# Patient Record
Sex: MALE | Race: WHITE | NOT HISPANIC OR LATINO | Employment: FULL TIME | URBAN - METROPOLITAN AREA
[De-identification: names, ages, dates, MRNs, and addresses within clinical notes are randomized per-mention and may not be internally consistent; named-entity substitution may affect disease eponyms.]

---

## 2017-01-27 ENCOUNTER — OFFICE VISIT (OUTPATIENT)
Dept: URGENT CARE | Facility: CLINIC | Age: 57
End: 2017-01-27
Payer: COMMERCIAL

## 2017-01-27 VITALS
HEIGHT: 73 IN | HEART RATE: 93 BPM | WEIGHT: 290 LBS | RESPIRATION RATE: 17 BRPM | SYSTOLIC BLOOD PRESSURE: 122 MMHG | TEMPERATURE: 98.1 F | OXYGEN SATURATION: 95 % | DIASTOLIC BLOOD PRESSURE: 78 MMHG | BODY MASS INDEX: 38.43 KG/M2

## 2017-01-27 DIAGNOSIS — J02.0 STREP THROAT: ICD-10-CM

## 2017-01-27 DIAGNOSIS — K52.9 GASTROENTERITIS: ICD-10-CM

## 2017-01-27 PROCEDURE — 99203 OFFICE O/P NEW LOW 30 MIN: CPT | Performed by: NURSE PRACTITIONER

## 2017-01-27 RX ORDER — FENOFIBRATE 200 MG/1
CAPSULE ORAL
COMMUNITY
Start: 2017-01-16 | End: 2017-03-03 | Stop reason: SDUPTHER

## 2017-01-27 RX ORDER — ROSUVASTATIN CALCIUM 10 MG/1
TABLET, COATED ORAL
COMMUNITY
Start: 2017-01-16 | End: 2017-03-03 | Stop reason: SDUPTHER

## 2017-01-27 RX ORDER — AMOXICILLIN 500 MG/1
500 CAPSULE ORAL 3 TIMES DAILY
Qty: 30 CAP | Refills: 0 | Status: SHIPPED | OUTPATIENT
Start: 2017-01-27 | End: 2017-02-06

## 2017-01-27 RX ORDER — ONDANSETRON 4 MG/1
4 TABLET, ORALLY DISINTEGRATING ORAL EVERY 8 HOURS PRN
Qty: 10 TAB | Refills: 0 | Status: SHIPPED | OUTPATIENT
Start: 2017-01-27 | End: 2017-03-03

## 2017-01-27 ASSESSMENT — ENCOUNTER SYMPTOMS
HEADACHES: 1
TROUBLE SWALLOWING: 1
DIARRHEA: 1
SORE THROAT: 1
SWOLLEN GLANDS: 1
CARDIOVASCULAR NEGATIVE: 1
NAUSEA: 1
VOMITING: 1
MYALGIAS: 1
ABDOMINAL PAIN: 0
BLOOD IN STOOL: 0
CHILLS: 1
RESPIRATORY NEGATIVE: 1

## 2017-01-27 NOTE — PROGRESS NOTES
"Subjective:      Antonio Foley is a 56 y.o. male who presents with Pharyngitis    PMH: denies hx of chronic illness    Social hx: non-smoker    Family hx: no other ill family members    Allergies: Review of patient's allergies indicates not on file.    This patient is a 56-year-old male who presents today with 2 complaints. First, he has been having nausea vomiting and diarrhea for the last 3 days. This morning his no longer having vomiting but is still having diarrhea. He reports stool is watery with no blood or mucus. He has traveled domestic lead to [facility] but no foreign travel. No suspected food intake, and no recent use of antibiotics. Secondly, patient complains of sore throat and swollen glands to the left side. He has had body aches and chills but no known fever.          Pharyngitis   This is a new problem. The current episode started yesterday. The problem has been unchanged. The pain is worse on the left side. There has been no fever. Associated symptoms include diarrhea, headaches, swollen glands, trouble swallowing and vomiting. Pertinent negatives include no abdominal pain. Associated symptoms comments: Vomiting and diarrhea. He has tried nothing for the symptoms. The treatment provided no relief.       Review of Systems   Constitutional: Positive for chills and malaise/fatigue.   HENT: Positive for sore throat and trouble swallowing.    Respiratory: Negative.    Cardiovascular: Negative.    Gastrointestinal: Positive for nausea, vomiting and diarrhea. Negative for abdominal pain and blood in stool.   Genitourinary: Negative.    Musculoskeletal: Positive for myalgias.   Neurological: Positive for headaches.       All other systems reviewed and are negative      Objective:     /78 mmHg  Pulse 93  Temp(Src) 36.7 °C (98.1 °F)  Resp 17  Ht 1.854 m (6' 1\")  Wt 131.543 kg (290 lb)  BMI 38.27 kg/m2  SpO2 95%     Physical Exam   Constitutional: He is oriented to person, place, and " time. He appears well-developed and well-nourished.   HENT:   Right Ear: External ear normal.   Left Ear: External ear normal.   Nose: Nose normal.   Mouth/Throat: No oropharyngeal exudate.   Oropharynx slightly red    Eyes: Conjunctivae and EOM are normal. Pupils are equal, round, and reactive to light. Right eye exhibits no discharge. Left eye exhibits no discharge.   Neck: Normal range of motion. Neck supple.   Cardiovascular: Normal rate and regular rhythm.    Pulmonary/Chest: Effort normal and breath sounds normal.   Abdominal: Soft. Bowel sounds are normal. He exhibits no distension. There is no tenderness.   Lymphadenopathy:     He has cervical adenopathy.   Neurological: He is alert and oriented to person, place, and time.   Skin: Skin is warm and dry.   Vitals reviewed.    Point-of-care strep: Positive          Assessment/Plan:   Gastroenteritis  Strep throat   -zofran   -imodium   -amoxil   -push fluids   -ER precautions for intractable vomiting/diarrhea or devleoping abdominal pain   -bland diet until symptoms resolve   There are no diagnoses linked to this encounter.

## 2017-01-27 NOTE — MR AVS SNAPSHOT
"        Antonio Snell Og   2017 8:45 AM   Office Visit   MRN: [id number]    Department:  [facility]   Dept Phone:  [phone]    Description:  Male : 1960   Provider:  Cathey J Hamman, A.P.N.           Reason for Visit     Pharyngitis congestion, cough, vomiting and diarrhea, x3days      Allergies as of 2017     Not on File      You were diagnosed with     Strep throat   [190687]       Gastroenteritis   [657769]         Vital Signs     Blood Pressure Pulse Temperature Respirations Height Weight    122/78 mmHg 93 36.7 °C (98.1 °F) 17 1.854 m (6' 1\") 131.543 kg (290 lb)    Body Mass Index Oxygen Saturation                38.27 kg/m2 95%          Basic Information     Date Of Birth Sex Race Ethnicity Preferred Language    1960 Male White Non- English      Health Maintenance     Patient has no pending health maintenance at this time      Current Immunizations     No immunizations on file.      Below and/or attached are the medications your provider expects you to take. Review all of your home medications and newly ordered medications with your provider and/or pharmacist. Follow medication instructions as directed by your provider and/or pharmacist. Please keep your medication list with you and share with your provider. Update the information when medications are discontinued, doses are changed, or new medications (including over-the-counter products) are added; and carry medication information at all times in the event of emergency situations     Allergies:  No Known Allergies          Medications  Valid as of: 2017 -  9:35 AM    Generic Name Brand Name Tablet Size Instructions for use    Amoxicillin (Cap) AMOXIL 500 MG Take 1 Cap by mouth 3 times a day for 10 days.        Fenofibrate Micronized (Cap) LOFIBRA 200 MG         Ondansetron (TABLET DISPERSIBLE) ZOFRAN ODT 4 MG Take 1 Tab by mouth every 8 hours as needed.        Rosuvastatin Calcium (Tab) CRESTOR 10 MG   "        Sertraline HCl   Take  by mouth.        .                 Medicines prescribed today were sent to:     [id number] - [place], [place] - [address]    [address] [place] [address]    Phone: [phone] Fax: [phone]    Open 24 Hours?: No      Medication refill instructions:       If your prescription bottle indicates you have medication refills left, it is not necessary to call your provider’s office. Please contact your pharmacy and they will refill your medication.    If your prescription bottle indicates you do not have any refills left, you may request refills at any time through one of the following ways: The online Spark system (except Urgent Care), by calling your provider’s office, or by asking your pharmacy to contact your provider’s office with a refill request. Medication refills are processed only during regular business hours and may not be available until the next business day. Your provider may request additional information or to have a follow-up visit with you prior to refilling your medication.   *Please Note: Medication refills are assigned a new Rx number when refilled electronically. Your pharmacy may indicate that no refills were authorized even though a new prescription for the same medication is available at the pharmacy. Please request the medicine by name with the pharmacy before contacting your provider for a refill.           Spark Access Code: [email or web address]  Expires: 2/26/2017  9:35 AM    Your email address is not on file at [x+1].  Email Addresses are required for you to sign up for Spark, please contact [phone] to verify your personal information and to provide your email address prior to attempting to register for Spark.    Antonio Foley  [address]  [place] [address]    Spark  A secure, online tool to manage your health information     [x+1]’s Spark® is a secure, online tool that connects you to  your personalized health information from the privacy of your home -- day or night - making it very easy for you to manage your healthcare. Once the activation process is completed, you can even access your medical information using the AdvanDx ciara, which is available for free in the Apple Ciara store or Google Play store.     To learn more about AdvanDx, visit [email or web address]    There are two levels of access available (as shown below):   My Chart Features  [facility] Doctor [facility]  Specialists [facility]  Urgent  Care Non-[facility] Primary Care Doctor   Email your healthcare team securely and privately 24/7 X X X    Manage appointments: schedule your next appointment; view details of past/upcoming appointments X      Request prescription refills. X      View recent personal medical records, including lab and immunizations X X X X   View health record, including health history, allergies, medications X X X X   Read reports about your outpatient visits, procedures, consult and ER notes X X X X   See your discharge summary, which is a recap of your hospital and/or ER visit that includes your diagnosis, lab results, and care plan X X  X     How to register for AdvanDx:  Once your e-mail address has been verified, follow the following steps to sign up for AdvanDx.     1. Go to  [email or web address]  2. Click on the Sign Up Now box, which takes you to the New Member Sign Up page. You will need to provide the following information:  a. Enter your AdvanDx Access Code exactly as it appears at the top of this page. (You will not need to use this code after you’ve completed the sign-up process. If you do not sign up before the expiration date, you must request a new code.)   b. Enter your date of birth.   c. Enter your home email address.   d. Click Submit, and follow the next screen’s instructions.  3. Create a AdvanDx ID. This will be your AdvanDx login ID and cannot be changed, so think of one that is secure  and easy to remember.  4. Create a Zuli password. You can change your password at any time.  5. Enter your Password Reset Question and Answer. This can be used at a later time if you forget your password.   6. Enter your e-mail address. This allows you to receive e-mail notifications when new information is available in Zuli.  7. Click Sign Up. You can now view your health information.    For assistance activating your Zuli account, call [phone]

## 2017-03-03 ENCOUNTER — OFFICE VISIT (OUTPATIENT)
Dept: MEDICAL GROUP | Facility: MEDICAL CENTER | Age: 57
End: 2017-03-03
Payer: COMMERCIAL

## 2017-03-03 VITALS
BODY MASS INDEX: 39.28 KG/M2 | HEIGHT: 73 IN | SYSTOLIC BLOOD PRESSURE: 116 MMHG | OXYGEN SATURATION: 94 % | TEMPERATURE: 97.9 F | WEIGHT: 296.4 LBS | HEART RATE: 64 BPM | RESPIRATION RATE: 18 BRPM | DIASTOLIC BLOOD PRESSURE: 74 MMHG

## 2017-03-03 DIAGNOSIS — E78.2 MIXED HYPERLIPIDEMIA: ICD-10-CM

## 2017-03-03 DIAGNOSIS — E66.9 OBESITY (BMI 30-39.9): ICD-10-CM

## 2017-03-03 DIAGNOSIS — Z76.89 ENCOUNTER TO ESTABLISH CARE: ICD-10-CM

## 2017-03-03 DIAGNOSIS — F32.A ANXIETY AND DEPRESSION: ICD-10-CM

## 2017-03-03 DIAGNOSIS — F41.9 ANXIETY AND DEPRESSION: ICD-10-CM

## 2017-03-03 DIAGNOSIS — Z00.00 PREVENTATIVE HEALTH CARE: ICD-10-CM

## 2017-03-03 PROCEDURE — 99204 OFFICE O/P NEW MOD 45 MIN: CPT | Performed by: PHYSICIAN ASSISTANT

## 2017-03-03 RX ORDER — SERTRALINE HYDROCHLORIDE 100 MG/1
150 TABLET, FILM COATED ORAL DAILY
COMMUNITY
End: 2017-03-03 | Stop reason: SDUPTHER

## 2017-03-03 RX ORDER — ROSUVASTATIN CALCIUM 10 MG/1
10 TABLET, COATED ORAL
Qty: 90 TAB | Refills: 3 | Status: SHIPPED | OUTPATIENT
Start: 2017-03-03 | End: 2018-01-09 | Stop reason: SDUPTHER

## 2017-03-03 RX ORDER — SERTRALINE HYDROCHLORIDE 100 MG/1
150 TABLET, FILM COATED ORAL DAILY
Qty: 135 TAB | Refills: 3 | Status: SHIPPED | OUTPATIENT
Start: 2017-03-03 | End: 2018-01-11 | Stop reason: SDUPTHER

## 2017-03-03 RX ORDER — FENOFIBRATE 200 MG/1
200 CAPSULE ORAL
Qty: 90 CAP | Refills: 3 | Status: SHIPPED | OUTPATIENT
Start: 2017-03-03 | End: 2018-01-09 | Stop reason: SDUPTHER

## 2017-03-03 ASSESSMENT — PATIENT HEALTH QUESTIONNAIRE - PHQ9: CLINICAL INTERPRETATION OF PHQ2 SCORE: 0

## 2017-03-03 ASSESSMENT — PAIN SCALES - GENERAL: PAINLEVEL: NO PAIN

## 2017-03-03 NOTE — PROGRESS NOTES
"Subjective:   Antonio Foley is a 56 y.o. male here today to establish care and for medication refill for anxiety and hyperlipidemia.    Anxiety and depression  This is a 56-year-old male who is here today for some medication refills. Has a history of anxiety with some depression. For 10 years has been taking sertraline 150 mg daily. Denies any homicidal or suicidal ideations. Symptoms are controlled.    Mixed hyperlipidemia  Also has a history of hyperlipidemia with elevated cholesterol and triglycerides. He is currently taking fenofibrate 200 mg daily and Crestor 10 mg daily. States that last labs performed showed cholesterol within normal limits.    Preventative health care  He had a colonoscopy a year ago. No polyps noted.    Obesity (BMI 30-39.9)  He realizes that his weight is high. He is currently trying to get into a gym at his facility to start exercising.       Current medicines (including changes today)  Current Outpatient Prescriptions   Medication Sig Dispense Refill   • sertraline (ZOLOFT) 100 MG Tab Take 1.5 Tabs by mouth every day. 135 Tab 3   • rosuvastatin (CRESTOR) 10 MG Tab Take 1 Tab by mouth every bedtime. 90 Tab 3   • fenofibrate micronized (LOFIBRA) 200 MG capsule Take 1 Cap by mouth every morning before breakfast. 90 Cap 3     No current facility-administered medications for this visit.     He  has no past medical history on file.    ROS   No chest pain, no shortness of breath, no abdominal pain and all other systems were reviewed and are negative.       Objective:     Blood pressure 116/74, pulse 64, temperature 36.6 °C (97.9 °F), resp. rate 18, height 1.854 m (6' 0.99\"), weight 134.446 kg (296 lb 6.4 oz), SpO2 94 %. Body mass index is 39.11 kg/(m^2).   Physical Exam:  Constitutional: Alert, no distress.  Skin: Warm, dry, good turgor, no rashes in visible areas.  Eye: Equal, round and reactive, conjunctiva clear, lids normal.  ENMT: Lips without lesions, good dentition, oropharynx " clear.  Neck: Trachea midline, no masses.   Lymph: No cervical or supraclavicular lymphadenopathy  Respiratory: Unlabored respiratory effort, lungs clear to auscultation, no wheezes, no ronchi.  Cardiovascular: Normal S1, S2, no murmur, no edema.  Abdomen: Soft, non-tender, no masses.  Psych: Alert and oriented x3, normal affect and mood.        Assessment and Plan:   The following treatment plan was discussed    1. Anxiety and depression  Chronic condition and controlled. Prescribed sertraline 150 mg daily. Offered referral to psychiatry but symptoms are well controlled.    2. Mixed hyperlipidemia  Chronic condition. Prescribed fenofibrate and Crestor 10 mg. Ordered lipid profile fasting. He will be contacted with results.  - LIPID PROFILE; Future    3. Obesity (BMI 30-39.9)  Discussed with importance of diet and exercising. We'll monitor.  - Patient identified as having weight management issue.  Appropriate orders and counseling given.    4. Preventative health care  Obtain fasting labs. He will be contacted with results. We'll try to obtain previous medical records for colonoscopy results.  - COMP METABOLIC PANEL; Future  - HEMOGLOBIN A1C; Future  - PROSTATE SPECIFIC AG    5. Encounter to establish care      Followup: Return if symptoms worsen or fail to improve.    Please note that this dictation was created using voice recognition software. I have made every reasonable attempt to correct obvious errors, but I expect that there are errors of grammar and possibly content that I did not discover before finalizing the note.

## 2017-03-03 NOTE — ASSESSMENT & PLAN NOTE
He realizes that his weight is high. He is currently trying to get into a gym at his facility to start exercising.

## 2017-03-03 NOTE — ASSESSMENT & PLAN NOTE
This is a 56-year-old male who is here today for some medication refills. Has a history of anxiety with some depression. For 10 years has been taking sertraline 150 mg daily. Denies any homicidal or suicidal ideations. Symptoms are controlled.

## 2017-03-03 NOTE — ASSESSMENT & PLAN NOTE
Also has a history of hyperlipidemia with elevated cholesterol and triglycerides. He is currently taking fenofibrate 200 mg daily and Crestor 10 mg daily. States that last labs performed showed cholesterol within normal limits.

## 2017-03-03 NOTE — MR AVS SNAPSHOT
"        Antonio Foley   3/3/2017 7:40 AM   Office Visit   MRN: [id number]    Department:  [place] [address]   Dept Phone:  [phone]    Description:  Male : 1960   Provider:  Jaime Castañeda PA-C           Reason for Visit     Establish Care physical with labs    Medication Refill Zoloft, fenofibrate and crestpr      Allergies as of 3/3/2017     No Known Allergies      You were diagnosed with     Anxiety and depression   [883107]       Mixed hyperlipidemia   [272.2.ICD-9-CM]       Obesity (BMI 30-39.9)   [843497]       Preventative health care   [337770]       Encounter to establish care   [324460]         Vital Signs     Blood Pressure Pulse Temperature Respirations Height Weight    116/74 mmHg 64 36.6 °C (97.9 °F) 18 1.854 m (6' 0.99\") 134.446 kg (296 lb 6.4 oz)    Body Mass Index Oxygen Saturation Smoking Status             39.11 kg/m2 94% Never Smoker          Basic Information     Date Of Birth Sex Race Ethnicity Preferred Language    1960 Male White Non- English      Problem List              ICD-10-CM Priority Class Noted - Resolved    Anxiety and depression F41.9, F32.9   3/3/2017 - Present    Mixed hyperlipidemia E78.2   3/3/2017 - Present    Preventative health care Z00.00   3/3/2017 - Present    Obesity (BMI 30-39.9) E66.9   3/3/2017 - Present      Health Maintenance        Date Due Completion Dates    IMM DTaP/Tdap/Td Vaccine (1 - Tdap) 1980 ---    IMM INFLUENZA (1) 3/3/2018 (Originally 2016) ---    COLONOSCOPY 3/1/2025 (Originally 2011) ---            Current Immunizations     No immunizations on file.      Below and/or attached are the medications your provider expects you to take. Review all of your home medications and newly ordered medications with your provider and/or pharmacist. Follow medication instructions as directed by your provider and/or pharmacist. Please keep your medication list with you and share with your provider. Update the information " when medications are discontinued, doses are changed, or new medications (including over-the-counter products) are added; and carry medication information at all times in the event of emergency situations     Allergies:  No Known Allergies          Medications  Valid as of: March 03, 2017 -  8:19 AM    Generic Name Brand Name Tablet Size Instructions for use    Fenofibrate Micronized (Cap) LOFIBRA 200 MG Take 1 Cap by mouth every morning before breakfast.        Rosuvastatin Calcium (Tab) CRESTOR 10 MG Take 1 Tab by mouth every bedtime.        Sertraline HCl (Tab) ZOLOFT 100 MG Take 1.5 Tabs by mouth every day.        .                 Medicines prescribed today were sent to:     [id number] - [place], [place] - [address]    [address] [place] [address]    Phone: [phone] Fax: [phone]    Open 24 Hours?: No      Medication refill instructions:       If your prescription bottle indicates you have medication refills left, it is not necessary to call your provider’s office. Please contact your pharmacy and they will refill your medication.    If your prescription bottle indicates you do not have any refills left, you may request refills at any time through one of the following ways: The online [employer] system (except Urgent Care), by calling your provider’s office, or by asking your pharmacy to contact your provider’s office with a refill request. Medication refills are processed only during regular business hours and may not be available until the next business day. Your provider may request additional information or to have a follow-up visit with you prior to refilling your medication.   *Please Note: Medication refills are assigned a new Rx number when refilled electronically. Your pharmacy may indicate that no refills were authorized even though a new prescription for the same medication is available at the pharmacy. Please request the medicine by name with the pharmacy before contacting  your provider for a refill.        Your To Do List     Future Labs/Procedures Complete By Expires    COMP METABOLIC PANEL  As directed 3/3/2018    HEMOGLOBIN A1C  As directed 3/3/2018    LIPID PROFILE  As directed 3/3/2018         MyChart Access Code: Activation code not generated  Current MyChart Status: Active

## 2018-03-28 RX ORDER — ROSUVASTATIN CALCIUM 10 MG/1
TABLET, COATED ORAL
Qty: 90 TAB | Refills: 0 | Status: SHIPPED | OUTPATIENT
Start: 2018-03-28 | End: 2018-03-31 | Stop reason: SDUPTHER

## 2018-03-28 RX ORDER — FENOFIBRATE 200 MG/1
CAPSULE ORAL
Qty: 90 CAP | Refills: 0 | Status: SHIPPED | OUTPATIENT
Start: 2018-03-28 | End: 2018-03-31 | Stop reason: SDUPTHER

## 2018-03-31 ENCOUNTER — OFFICE VISIT (OUTPATIENT)
Dept: URGENT CARE | Facility: CLINIC | Age: 58
End: 2018-03-31
Payer: COMMERCIAL

## 2018-03-31 VITALS
SYSTOLIC BLOOD PRESSURE: 118 MMHG | HEIGHT: 72 IN | OXYGEN SATURATION: 95 % | HEART RATE: 62 BPM | WEIGHT: 293 LBS | TEMPERATURE: 97.7 F | BODY MASS INDEX: 39.68 KG/M2 | RESPIRATION RATE: 16 BRPM | DIASTOLIC BLOOD PRESSURE: 78 MMHG

## 2018-03-31 DIAGNOSIS — R05.8 PRODUCTIVE COUGH: ICD-10-CM

## 2018-03-31 DIAGNOSIS — F41.9 ANXIETY: ICD-10-CM

## 2018-03-31 DIAGNOSIS — E78.2 MIXED HYPERLIPIDEMIA: ICD-10-CM

## 2018-03-31 PROCEDURE — 99214 OFFICE O/P EST MOD 30 MIN: CPT | Performed by: NURSE PRACTITIONER

## 2018-03-31 RX ORDER — ALBUTEROL SULFATE 90 UG/1
2 AEROSOL, METERED RESPIRATORY (INHALATION) EVERY 6 HOURS PRN
Qty: 8.5 G | Refills: 0 | Status: SHIPPED | OUTPATIENT
Start: 2018-03-31 | End: 2018-10-18

## 2018-03-31 RX ORDER — CODEINE PHOSPHATE AND GUAIFENESIN 10; 100 MG/5ML; MG/5ML
5 SOLUTION ORAL EVERY 6 HOURS PRN
Qty: 120 ML | Refills: 0 | Status: SHIPPED | OUTPATIENT
Start: 2018-03-31 | End: 2018-04-07

## 2018-03-31 RX ORDER — FENOFIBRATE 200 MG/1
CAPSULE ORAL
Qty: 30 CAP | Refills: 0 | Status: SHIPPED | OUTPATIENT
Start: 2018-03-31 | End: 2018-09-28 | Stop reason: SDUPTHER

## 2018-03-31 RX ORDER — SERTRALINE HYDROCHLORIDE 100 MG/1
TABLET, FILM COATED ORAL
Qty: 45 TAB | Refills: 0 | Status: SHIPPED | OUTPATIENT
Start: 2018-03-31 | End: 2018-09-28 | Stop reason: SDUPTHER

## 2018-03-31 RX ORDER — ROSUVASTATIN CALCIUM 10 MG/1
TABLET, COATED ORAL
Qty: 30 TAB | Refills: 0 | Status: SHIPPED | OUTPATIENT
Start: 2018-03-31 | End: 2018-09-28 | Stop reason: SDUPTHER

## 2018-03-31 RX ORDER — DOXYCYCLINE HYCLATE 100 MG
100 TABLET ORAL 2 TIMES DAILY
Qty: 14 TAB | Refills: 0 | Status: SHIPPED | OUTPATIENT
Start: 2018-03-31 | End: 2018-04-07

## 2018-03-31 ASSESSMENT — ENCOUNTER SYMPTOMS
SPUTUM PRODUCTION: 1
MYALGIAS: 1
COUGH: 1
CHILLS: 1
SHORTNESS OF BREATH: 1

## 2018-03-31 NOTE — PROGRESS NOTES
Subjective:      Antonio Foley is a 57 y.o. male who presents with Cough (green phlegm, x2wks, in last week has progressed, tired)    History reviewed. No pertinent past medical history.  Social History     Social History   • Marital status:      Spouse name: N/A   • Number of children: N/A   • Years of education: N/A     Occupational History   • Not on file.     Social History Main Topics   • Smoking status: Never Smoker   • Smokeless tobacco: Never Used   • Alcohol use 1.2 oz/week     2 Shots of liquor per week   • Drug use: No   • Sexual activity: Yes     Partners: Male     Other Topics Concern   • Not on file     Social History Narrative   • No narrative on file     History reviewed. No pertinent family history.    Allergies: Patient has no known allergies.    Patient is a 57-year-old male who presents today with complaint of productive cough over the last 2 weeks. He states symptoms have been persistent. He denies fever, aches, or chills. He does state that the cough is keeping him from sleeping well at night.    Secondly, patient has an appointment to see his primary care physician however is about to run out of his routine medications which include Zoloft, Crestor, and Lofibra. He is requesting a one-month refill on these medications until he can get into his PCP.          Cough   This is a new problem. The current episode started in the past 7 days. The problem has been unchanged. The cough is productive of purulent sputum. Associated symptoms include chills, myalgias, nasal congestion, postnasal drip and shortness of breath. Nothing aggravates the symptoms. He has tried nothing for the symptoms. The treatment provided no relief.       Review of Systems   Constitutional: Positive for chills and malaise/fatigue.   HENT: Positive for congestion and postnasal drip.    Respiratory: Positive for cough, sputum production and shortness of breath.    Musculoskeletal: Positive for myalgias.   Skin:  Negative.    All other systems reviewed and are negative.         Objective:     /78   Pulse 62   Temp 36.5 °C (97.7 °F)   Resp 16   Ht 1.829 m (6')   Wt (!) 132.9 kg (293 lb)   SpO2 95%   BMI 39.74 kg/m²      Physical Exam   Constitutional: He is oriented to person, place, and time. He appears well-developed and well-nourished.   HENT:   Head: Normocephalic.   Right Ear: External ear normal.   Left Ear: External ear normal.   Nose: Nose normal.   Mouth/Throat: Oropharynx is clear and moist. No oropharyngeal exudate.   Eyes: Conjunctivae and EOM are normal. Pupils are equal, round, and reactive to light.   Neck: Normal range of motion. Neck supple.   Cardiovascular: Normal rate and regular rhythm.    Pulmonary/Chest: Effort normal. He has wheezes.   Few scattered wheezes and coarse rhonchi.   Musculoskeletal: Normal range of motion.   Neurological: He is alert and oriented to person, place, and time.   Skin: Skin is warm and dry. Capillary refill takes less than 2 seconds.   Psychiatric: He has a normal mood and affect. His behavior is normal. Judgment and thought content normal.   Vitals reviewed.              Assessment/Plan:     1. Productive cough    -Cheratussin; checked patient's  and find no evidence of narcotic misuse. ORT score is zero    2. Bronchitis  -Doxycycline  -Cheratussin; checked patient's  and find no evidence of narcotic misuse. ORT score is zero  -Albuterol when necessary   -Follow-up if symptoms persist or worsen      3. Anxiety and depression  -has been stable on zoloft  -no suicidal or homicidal ideation  -refilled zoloft x 1 month    4. Hyperlipidemia  -Refilled Lofibra and Crestor x 1 month  -keep follow up with PCP.

## 2018-05-02 RX ORDER — ROSUVASTATIN CALCIUM 10 MG/1
TABLET, COATED ORAL
Qty: 90 TAB | Refills: 1 | Status: SHIPPED | OUTPATIENT
Start: 2018-05-02 | End: 2018-09-28

## 2018-09-20 DIAGNOSIS — Z00.00 PREVENTATIVE HEALTH CARE: ICD-10-CM

## 2018-09-20 DIAGNOSIS — E78.5 HYPERLIPIDEMIA, UNSPECIFIED HYPERLIPIDEMIA TYPE: ICD-10-CM

## 2018-09-21 ENCOUNTER — HOSPITAL ENCOUNTER (OUTPATIENT)
Dept: LAB | Facility: MEDICAL CENTER | Age: 58
End: 2018-09-21
Attending: PHYSICIAN ASSISTANT
Payer: COMMERCIAL

## 2018-09-21 DIAGNOSIS — Z00.00 PREVENTATIVE HEALTH CARE: ICD-10-CM

## 2018-09-21 DIAGNOSIS — E78.5 HYPERLIPIDEMIA, UNSPECIFIED HYPERLIPIDEMIA TYPE: ICD-10-CM

## 2018-09-21 PROBLEM — E11.9 TYPE 2 DIABETES MELLITUS WITHOUT COMPLICATION, WITHOUT LONG-TERM CURRENT USE OF INSULIN (HCC): Status: ACTIVE | Noted: 2018-09-21

## 2018-09-21 LAB
ALBUMIN SERPL BCP-MCNC: 4.7 G/DL (ref 3.2–4.9)
ALBUMIN/GLOB SERPL: 1.8 G/DL
ALP SERPL-CCNC: 45 U/L (ref 30–99)
ALT SERPL-CCNC: 66 U/L (ref 2–50)
ANION GAP SERPL CALC-SCNC: 7 MMOL/L (ref 0–11.9)
AST SERPL-CCNC: 39 U/L (ref 12–45)
BILIRUB SERPL-MCNC: 0.7 MG/DL (ref 0.1–1.5)
BUN SERPL-MCNC: 15 MG/DL (ref 8–22)
CALCIUM SERPL-MCNC: 9.4 MG/DL (ref 8.5–10.5)
CHLORIDE SERPL-SCNC: 106 MMOL/L (ref 96–112)
CHOLEST SERPL-MCNC: 161 MG/DL (ref 100–199)
CO2 SERPL-SCNC: 27 MMOL/L (ref 20–33)
CREAT SERPL-MCNC: 0.92 MG/DL (ref 0.5–1.4)
EST. AVERAGE GLUCOSE BLD GHB EST-MCNC: 143 MG/DL
FASTING STATUS PATIENT QL REPORTED: NORMAL
GLOBULIN SER CALC-MCNC: 2.6 G/DL (ref 1.9–3.5)
GLUCOSE SERPL-MCNC: 144 MG/DL (ref 65–99)
HBA1C MFR BLD: 6.6 % (ref 0–5.6)
HDLC SERPL-MCNC: 38 MG/DL
LDLC SERPL CALC-MCNC: 47 MG/DL
POTASSIUM SERPL-SCNC: 4.3 MMOL/L (ref 3.6–5.5)
PROT SERPL-MCNC: 7.3 G/DL (ref 6–8.2)
SODIUM SERPL-SCNC: 140 MMOL/L (ref 135–145)
TRIGL SERPL-MCNC: 381 MG/DL (ref 0–149)

## 2018-09-21 PROCEDURE — 80053 COMPREHEN METABOLIC PANEL: CPT

## 2018-09-21 PROCEDURE — 83036 HEMOGLOBIN GLYCOSYLATED A1C: CPT

## 2018-09-21 PROCEDURE — 80061 LIPID PANEL: CPT

## 2018-09-21 PROCEDURE — 36415 COLL VENOUS BLD VENIPUNCTURE: CPT

## 2018-09-28 ENCOUNTER — HOSPITAL ENCOUNTER (OUTPATIENT)
Dept: LAB | Facility: MEDICAL CENTER | Age: 58
End: 2018-09-28
Attending: PHYSICIAN ASSISTANT
Payer: COMMERCIAL

## 2018-09-28 ENCOUNTER — OFFICE VISIT (OUTPATIENT)
Dept: MEDICAL GROUP | Facility: MEDICAL CENTER | Age: 58
End: 2018-09-28
Payer: COMMERCIAL

## 2018-09-28 VITALS
DIASTOLIC BLOOD PRESSURE: 60 MMHG | SYSTOLIC BLOOD PRESSURE: 125 MMHG | TEMPERATURE: 98.2 F | HEIGHT: 72 IN | BODY MASS INDEX: 39.82 KG/M2 | WEIGHT: 294 LBS | HEART RATE: 61 BPM | OXYGEN SATURATION: 94 %

## 2018-09-28 DIAGNOSIS — E78.2 MIXED HYPERLIPIDEMIA: ICD-10-CM

## 2018-09-28 DIAGNOSIS — R79.89 ABNORMAL CBC: ICD-10-CM

## 2018-09-28 DIAGNOSIS — E78.1 HYPERTRIGLYCERIDEMIA: ICD-10-CM

## 2018-09-28 DIAGNOSIS — R53.82 CHRONIC FATIGUE: ICD-10-CM

## 2018-09-28 DIAGNOSIS — F41.9 ANXIETY: ICD-10-CM

## 2018-09-28 DIAGNOSIS — G47.33 OBSTRUCTIVE SLEEP APNEA SYNDROME: ICD-10-CM

## 2018-09-28 DIAGNOSIS — E66.9 OBESITY (BMI 30-39.9): ICD-10-CM

## 2018-09-28 DIAGNOSIS — F32.A ANXIETY AND DEPRESSION: ICD-10-CM

## 2018-09-28 DIAGNOSIS — F41.9 ANXIETY AND DEPRESSION: ICD-10-CM

## 2018-09-28 DIAGNOSIS — E11.9 TYPE 2 DIABETES MELLITUS WITHOUT COMPLICATION, WITHOUT LONG-TERM CURRENT USE OF INSULIN (HCC): ICD-10-CM

## 2018-09-28 DIAGNOSIS — R22.2 ABDOMINAL WALL MASS: ICD-10-CM

## 2018-09-28 DIAGNOSIS — Z12.5 SCREENING PSA (PROSTATE SPECIFIC ANTIGEN): ICD-10-CM

## 2018-09-28 LAB
BASOPHILS # BLD AUTO: 0.5 % (ref 0–1.8)
BASOPHILS # BLD: 0.03 K/UL (ref 0–0.12)
EOSINOPHIL # BLD AUTO: 0.21 K/UL (ref 0–0.51)
EOSINOPHIL NFR BLD: 3.6 % (ref 0–6.9)
ERYTHROCYTE [DISTWIDTH] IN BLOOD BY AUTOMATED COUNT: 38.2 FL (ref 35.9–50)
HCT VFR BLD AUTO: 43.3 % (ref 42–52)
HGB BLD-MCNC: 14.3 G/DL (ref 14–18)
IMM GRANULOCYTES # BLD AUTO: 0.25 K/UL (ref 0–0.11)
IMM GRANULOCYTES NFR BLD AUTO: 4.3 % (ref 0–0.9)
LYMPHOCYTES # BLD AUTO: 1.65 K/UL (ref 1–4.8)
LYMPHOCYTES NFR BLD: 28.2 % (ref 22–41)
MCH RBC QN AUTO: 26.9 PG (ref 27–33)
MCHC RBC AUTO-ENTMCNC: 33 G/DL (ref 33.7–35.3)
MCV RBC AUTO: 81.4 FL (ref 81.4–97.8)
MONOCYTES # BLD AUTO: 0.46 K/UL (ref 0–0.85)
MONOCYTES NFR BLD AUTO: 7.8 % (ref 0–13.4)
NEUTROPHILS # BLD AUTO: 3.26 K/UL (ref 1.82–7.42)
NEUTROPHILS NFR BLD: 55.6 % (ref 44–72)
NRBC # BLD AUTO: 0 K/UL
NRBC BLD-RTO: 0 /100 WBC
PLATELET # BLD AUTO: 164 K/UL (ref 164–446)
PMV BLD AUTO: 13.6 FL (ref 9–12.9)
PSA SERPL-MCNC: 0.9 NG/ML (ref 0–4)
RBC # BLD AUTO: 5.32 M/UL (ref 4.7–6.1)
TSH SERPL DL<=0.005 MIU/L-ACNC: 1.3 UIU/ML (ref 0.38–5.33)
WBC # BLD AUTO: 5.9 K/UL (ref 4.8–10.8)

## 2018-09-28 PROCEDURE — 84443 ASSAY THYROID STIM HORMONE: CPT

## 2018-09-28 PROCEDURE — 36415 COLL VENOUS BLD VENIPUNCTURE: CPT

## 2018-09-28 PROCEDURE — 84270 ASSAY OF SEX HORMONE GLOBUL: CPT

## 2018-09-28 PROCEDURE — 84153 ASSAY OF PSA TOTAL: CPT

## 2018-09-28 PROCEDURE — 99214 OFFICE O/P EST MOD 30 MIN: CPT | Performed by: PHYSICIAN ASSISTANT

## 2018-09-28 PROCEDURE — 84403 ASSAY OF TOTAL TESTOSTERONE: CPT

## 2018-09-28 PROCEDURE — 85025 COMPLETE CBC W/AUTO DIFF WBC: CPT

## 2018-09-28 RX ORDER — METFORMIN HYDROCHLORIDE 500 MG/1
500 TABLET, EXTENDED RELEASE ORAL DAILY
Qty: 30 TAB | Refills: 3 | Status: SHIPPED | OUTPATIENT
Start: 2018-09-28 | End: 2018-11-02 | Stop reason: SDUPTHER

## 2018-09-28 RX ORDER — ROSUVASTATIN CALCIUM 20 MG/1
TABLET, COATED ORAL
Qty: 90 TAB | Refills: 3 | Status: SHIPPED | OUTPATIENT
Start: 2018-09-28 | End: 2018-10-18

## 2018-09-28 RX ORDER — FENOFIBRATE 200 MG/1
CAPSULE ORAL
Qty: 90 CAP | Refills: 3 | Status: SHIPPED | OUTPATIENT
Start: 2018-09-28

## 2018-09-28 RX ORDER — SERTRALINE HYDROCHLORIDE 100 MG/1
TABLET, FILM COATED ORAL
Qty: 135 TAB | Refills: 3 | Status: SHIPPED | OUTPATIENT
Start: 2018-09-28

## 2018-09-28 ASSESSMENT — PATIENT HEALTH QUESTIONNAIRE - PHQ9
8. MOVING OR SPEAKING SO SLOWLY THAT OTHER PEOPLE COULD HAVE NOTICED. OR THE OPPOSITE, BEING SO FIGETY OR RESTLESS THAT YOU HAVE BEEN MOVING AROUND A LOT MORE THAN USUAL: NOT AT ALL
4. FEELING TIRED OR HAVING LITTLE ENERGY: NEARLY EVERY DAY
SUM OF ALL RESPONSES TO PHQ QUESTIONS 1-9: 6
3. TROUBLE FALLING OR STAYING ASLEEP OR SLEEPING TOO MUCH: NOT AT ALL
1. LITTLE INTEREST OR PLEASURE IN DOING THINGS: SEVERAL DAYS
7. TROUBLE CONCENTRATING ON THINGS, SUCH AS READING THE NEWSPAPER OR WATCHING TELEVISION: NOT AT ALL
SUM OF ALL RESPONSES TO PHQ9 QUESTIONS 1 AND 2: 2
2. FEELING DOWN, DEPRESSED, IRRITABLE, OR HOPELESS: SEVERAL DAYS
5. POOR APPETITE OR OVEREATING: SEVERAL DAYS
6. FEELING BAD ABOUT YOURSELF - OR THAT YOU ARE A FAILURE OR HAVE LET YOURSELF OR YOUR FAMILY DOWN: NOT AL ALL
9. THOUGHTS THAT YOU WOULD BE BETTER OFF DEAD, OR OF HURTING YOURSELF: NOT AT ALL

## 2018-09-28 NOTE — ASSESSMENT & PLAN NOTE
Chronic history of anxiety and depression. No recent exacerbation. Takes 1.5 tablets of Zoloft 100 mg daily. States his depression and anxiety are controlled. Denies any homicidal or suicidal ideations.

## 2018-09-28 NOTE — ASSESSMENT & PLAN NOTE
This is a 58-year-old male who is here today to discuss some chronic medical conditions. Recently labs showed A1c of 6.6. He denies any polyuria polydipsia. He is willing to start medication. No history of diabetes.

## 2018-09-28 NOTE — ASSESSMENT & PLAN NOTE
Complains of significant fatigue over the past month. He is tired all the time. Does have sleep apnea but uses a CPAP machine. Needs to establish with a new pulmonologist. States that the CPAP machine is effective. Gets 9 hours of sleep at night. Denies falling asleep at the drop of a hat. Denies any worsening symptoms such as anxiety or depression. He does have a high stress job at the [employer] in a  level but he's been working a job for many years.

## 2018-09-28 NOTE — ASSESSMENT & PLAN NOTE
Triglycerides were in the 300s. He states taking fenofibrate 200 mg daily. Also takes Crestor 10 mg daily. Chronic condition.

## 2018-09-28 NOTE — ASSESSMENT & PLAN NOTE
Complains of a right upper quadrant soft tissue nontender mass that he can touch. Denies any abdominal pain. No nausea or vomiting. No change in bowel habits.

## 2018-09-28 NOTE — PROGRESS NOTES
Subjective:   Antonio Foley is a 58 y.o. male here today for new onset of type 2 diabetes, hypertriglyceridemia, chronic fatigue, anxiety and depression, abnormal CBC history and abdominal wall mass for approximately 2 months.    Type 2 diabetes mellitus without complication, without long-term current use of insulin (HCC)  This is a 58-year-old male who is here today to discuss some chronic medical conditions. Recently labs showed A1c of 6.6. He denies any polyuria polydipsia. He is willing to start medication. No history of diabetes.    Hypertriglyceridemia  Triglycerides were in the 300s. He states taking fenofibrate 200 mg daily. Also takes Crestor 10 mg daily. Chronic condition.    Chronic fatigue  Complains of significant fatigue over the past month. He is tired all the time. Does have sleep apnea but uses a CPAP machine. Needs to establish with a new pulmonologist. States that the CPAP machine is effective. Gets 9 hours of sleep at night. Denies falling asleep at the drop of a hat. Denies any worsening symptoms such as anxiety or depression. 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    Anxiety and depression  Chronic history of anxiety and depression. No recent exacerbation. Takes 1.5 tablets of Zoloft 100 mg daily. States his depression and anxiety are controlled. Denies any homicidal or suicidal ideations.    Abnormal CBC  Previous CBC showed his myelocytes slightly elevated. He is concerned about leukemia.    Abdominal wall mass  Complains of a right upper quadrant soft tissue nontender mass that he can touch. Denies any abdominal pain. No nausea or vomiting. No change in bowel habits.       Current medicines (including changes today)  Current Outpatient Prescriptions   Medication Sig Dispense Refill   • metFORMIN ER (GLUCOPHAGE XR) 500 MG TABLET SR 24 HR Take 1 Tab by mouth every day. 30 Tab 3   • rosuvastatin (CRESTOR) 20 MG Tab TAKE 1  TABLET BY MOUTH EVERY NIGHT AT BEDTIME. 90 Tab 3   • fenofibrate micronized (LOFIBRA) 200 MG capsule TAKE 1 CAPSULE BY MOUTH EVERY MORNING BEFORE BREAKFAST. 90 Cap 3   • sertraline (ZOLOFT) 100 MG Tab TAKE 1.5 TABLETS BY MOUTH EVERY DAY. 135 Tab 3   • albuterol 108 (90 Base) MCG/ACT Aero Soln inhalation aerosol Inhale 2 Puffs by mouth every 6 hours as needed. 8.5 g 0     No current facility-administered medications for this visit.      He  has no past medical history on file.    Social History and Family History were reviewed and updated.    ROS   No chest pain, no shortness of breath, no abdominal pain and all other systems were reviewed and are negative.       Objective:     Blood pressure 125/60, pulse 61, temperature 36.8 °C (98.2 °F), height 1.829 m (6'), weight (!) 133.4 kg (294 lb), SpO2 94 %. Body mass index is 39.87 kg/m².   Physical Exam:  Constitutional: Alert, no distress.  Skin: Warm, dry, good turgor, no rashes in visible areas.  Eye: Equal, round and reactive, conjunctiva clear, lids normal.  ENMT: Lips without lesions, good dentition, oropharynx clear.  Neck: Trachea midline, no masses.   Lymph: No cervical or supraclavicular lymphadenopathy  Respiratory: Unlabored respiratory effort, lungs clear to auscultation, no wheezes, no ronchi.  Cardiovascular: Normal S1, S2, no murmur, no edema.  Abdomen: Soft, non-tender, right upper quadrant soft tissue mass. No tenderness.  Psych: Alert and oriented x3, normal affect and mood.        Assessment and Plan:   The following treatment plan was discussed    1. Type 2 diabetes mellitus without complication, without long-term current use of insulin (HCC)  New-onset condition. We'll start Glucophage 500 mg daily. Ordered CMP and cholesterol profile in one month. Refer to cardiology.  - metFORMIN ER (GLUCOPHAGE XR) 500 MG TABLET SR 24 HR; Take 1 Tab by mouth every day.  Dispense: 30 Tab; Refill: 3  - COMP METABOLIC PANEL; Future  - rosuvastatin (CRESTOR) 20 MG Tab;  TAKE 1 TABLET BY MOUTH EVERY NIGHT AT BEDTIME.  Dispense: 90 Tab; Refill: 3  - LIPID PROFILE; Future  - REFERRAL TO CARDIOLOGY    2. Hypertriglyceridemia  Chronic condition. Uncontrolled. Increase Crestor to 20 mg daily. Continue fenofibrate. Lose weight. Exercise routinely. Eat healthier.  - rosuvastatin (CRESTOR) 20 MG Tab; TAKE 1 TABLET BY MOUTH EVERY NIGHT AT BEDTIME.  Dispense: 90 Tab; Refill: 3  - fenofibrate micronized (LOFIBRA) 200 MG capsule; TAKE 1 CAPSULE BY MOUTH EVERY MORNING BEFORE BREAKFAST.  Dispense: 90 Cap; Refill: 3  - LIPID PROFILE; Future  - REFERRAL TO CARDIOLOGY    3. Obesity (BMI 30-39.9)  Chronic condition. We'll monitor.  - Patient identified as having weight management issue.  Appropriate orders and counseling given.  - REFERRAL TO CARDIOLOGY    4. Mixed hyperlipidemia  Chronic condition. Ordered Crestor and find a fibroid. Increase dose of Crestor. Refer to cardiology.  - rosuvastatin (CRESTOR) 20 MG Tab; TAKE 1 TABLET BY MOUTH EVERY NIGHT AT BEDTIME.  Dispense: 90 Tab; Refill: 3  - fenofibrate micronized (LOFIBRA) 200 MG capsule; TAKE 1 CAPSULE BY MOUTH EVERY MORNING BEFORE BREAKFAST.  Dispense: 90 Cap; Refill: 3  - REFERRAL TO CARDIOLOGY    5. Screening PSA (prostate specific antigen)  Ordered PSA.  - PROSTATE SPECIFIC AG SCREENING; Future    6. Abnormal CBC  Ordered CBC.  - CBC WITH DIFFERENTIAL; Future    7. Abdominal wall mass  New-onset condition. Likely lipoma. Ordered ultrasound limited.  - US-ABDOMEN LTD (SOFT TISSUE); Future    8. Chronic fatigue  Acute, new-onset condition. Referred to pulmonology to update CPAP device if needed. Ordered testosterone and thyroid panel. Check CBC.  - TESTOSTERONE F&T MALE ADULT; Future  - TSH WITH REFLEX TO FT4; Future  - REFERRAL TO PULMONOLOGY    9. Obstructive sleep apnea syndrome  Chronic condition. Stable. Referred to pulmonology to establish care.  - REFERRAL TO PULMONOLOGY    10. Anxiety and depression  Condition. Stable. Renewed Zoloft  as directed.  - sertraline (ZOLOFT) 100 MG Tab; TAKE 1.5 TABLETS BY MOUTH EVERY DAY.  Dispense: 135 Tab; Refill: 3      Followup: Return in about 2 months (around 11/28/2018).    Please note that this dictation was created using voice recognition software. I have made every reasonable attempt to correct obvious errors, but I expect that there are errors of grammar and possibly content that I did not discover before finalizing the note.

## 2018-09-29 ENCOUNTER — HOSPITAL ENCOUNTER (OUTPATIENT)
Dept: RADIOLOGY | Facility: MEDICAL CENTER | Age: 58
End: 2018-09-29
Attending: PHYSICIAN ASSISTANT
Payer: COMMERCIAL

## 2018-09-29 DIAGNOSIS — R22.2 ABDOMINAL WALL MASS: ICD-10-CM

## 2018-09-29 LAB
SHBG SERPL-SCNC: 14 NMOL/L (ref 11–80)
TESTOST FREE MFR SERPL: 2.5 % (ref 1.6–2.9)
TESTOST FREE SERPL-MCNC: 61 PG/ML (ref 47–244)
TESTOST SERPL-MCNC: 240 NG/DL (ref 300–890)

## 2018-09-29 PROCEDURE — 76705 ECHO EXAM OF ABDOMEN: CPT

## 2018-09-30 DIAGNOSIS — E29.1 HYPOGONADISM MALE: ICD-10-CM

## 2018-10-01 ENCOUNTER — OFFICE VISIT (OUTPATIENT)
Dept: ENDOCRINOLOGY | Facility: MEDICAL CENTER | Age: 58
End: 2018-10-01
Payer: COMMERCIAL

## 2018-10-01 VITALS
WEIGHT: 300 LBS | HEIGHT: 73 IN | BODY MASS INDEX: 39.76 KG/M2 | DIASTOLIC BLOOD PRESSURE: 70 MMHG | SYSTOLIC BLOOD PRESSURE: 118 MMHG | HEART RATE: 71 BPM | OXYGEN SATURATION: 97 %

## 2018-10-01 DIAGNOSIS — E11.9 TYPE 2 DIABETES MELLITUS WITHOUT COMPLICATION, WITHOUT LONG-TERM CURRENT USE OF INSULIN (HCC): ICD-10-CM

## 2018-10-01 DIAGNOSIS — R53.82 CHRONIC FATIGUE: ICD-10-CM

## 2018-10-01 DIAGNOSIS — E66.9 OBESITY (BMI 30-39.9): ICD-10-CM

## 2018-10-01 DIAGNOSIS — R79.89 ABNORMAL CBC: ICD-10-CM

## 2018-10-01 DIAGNOSIS — E29.1 HYPOGONADISM MALE: ICD-10-CM

## 2018-10-01 PROCEDURE — 99204 OFFICE O/P NEW MOD 45 MIN: CPT | Mod: 25 | Performed by: INTERNAL MEDICINE

## 2018-10-01 PROCEDURE — 96372 THER/PROPH/DIAG INJ SC/IM: CPT | Performed by: INTERNAL MEDICINE

## 2018-10-01 RX ORDER — LANCETS 30 GAUGE
EACH MISCELLANEOUS
Qty: 100 EACH | Refills: 11 | Status: SHIPPED | OUTPATIENT
Start: 2018-10-01

## 2018-10-01 NOTE — PROGRESS NOTES
New Patient Consult Note  Primary care physician: Jaime Castañeda P.A.-C.    Reason for consult: Management of Uncontrolled Type 2 Diabetes, obesity, and fatigue.  He thinks his testosterone is low too.    HPI:  Antonio Foley is a 58 y.o. old patient who comes in today for evaluation of above stated problem.    Most Recent HbA1c:   Lab Results   Component Value Date/Time    HBA1C 6.6 (H) 09/21/2018 07:24 AM        Current Diabetes Regimen:    Other: Metformin  mg daily  He has not started testing blood sugars yet  Hypoglycemia:  None      ROS:  Constitutional: No weight loss  Cardiac: No palpitations or racing heart  Resp: No shortness of breath  Neuro: No numbness or tinging in feet  Endo: No heat or cold intolerance, no polyuria or polydipsia  All other systems were reviewed and were negative.    Past Medical History:  Patient Active Problem List    Diagnosis Date Noted   • Hypogonadism male 09/30/2018   • Hypertriglyceridemia 09/28/2018   • Chronic fatigue 09/28/2018   • Abnormal CBC 09/28/2018   • Abdominal wall mass 09/28/2018   • Obstructive sleep apnea syndrome 09/28/2018   • Type 2 diabetes mellitus without complication, without long-term current use of insulin (HCC) 09/21/2018   • Anxiety and depression 03/03/2017   • Mixed hyperlipidemia 03/03/2017   • Preventative health care 03/03/2017   • Obesity (BMI 30-39.9) 03/03/2017       Past Surgical History:  History reviewed. No pertinent surgical history.    Allergies:  Patient has no known allergies.    Social History:  Social History     Social History   • Marital status:      Spouse name: N/A   • Number of children: N/A   • Years of education: N/A     Occupational History   • Not on file.     Social History Main Topics   • Smoking status: Never Smoker   • Smokeless tobacco: Never Used   • Alcohol use 1.2 oz/week     2 Shots of liquor per week   • Drug use: No   • Sexual activity: Yes     Partners: Male     Other Topics Concern   • Not  "on file     Social History Narrative   • No narrative on file       Family History:  Family History   Problem Relation Age of Onset   • Cancer Father 80        Liver CA       Medications:    Current Outpatient Prescriptions:   •  Lancets Misc, Lancets order: Lancets for insurance preferred meter. Sig: use TID and prn ssx high or low sugar. #100 RF x 3, Disp: 100 Each, Rfl: 11  •  Blood Glucose Monitoring Suppl Supplies Misc, Test strips order: Test strips for [employer] Contour Next meter. Sig: use 3 daily and prn ssx high or low sugar, Disp: 100 Strip, Rfl: 11  •  metFORMIN ER (GLUCOPHAGE XR) 500 MG TABLET SR 24 HR, Take 1 Tab by mouth every day., Disp: 30 Tab, Rfl: 3  •  rosuvastatin (CRESTOR) 20 MG Tab, TAKE 1 TABLET BY MOUTH EVERY NIGHT AT BEDTIME., Disp: 90 Tab, Rfl: 3  •  fenofibrate micronized (LOFIBRA) 200 MG capsule, TAKE 1 CAPSULE BY MOUTH EVERY MORNING BEFORE BREAKFAST., Disp: 90 Cap, Rfl: 3  •  sertraline (ZOLOFT) 100 MG Tab, TAKE 1.5 TABLETS BY MOUTH EVERY DAY., Disp: 135 Tab, Rfl: 3  •  Blood Glucose Monitoring Suppl Device, Meter: Dispense Device of Insurance Preference. Sig. Use as directed for blood sugar monitoring. #1. NR., Disp: 1 Device, Rfl: 0  •  Blood Glucose Monitoring Suppl Supplies Misc, Test strips order: Test strips for insurance preferred meter. Sig: use TID and prn ssx high or low sugar #100 RF x 3, Disp: 100 Strip, Rfl: 11  •  albuterol 108 (90 Base) MCG/ACT Aero Soln inhalation aerosol, Inhale 2 Puffs by mouth every 6 hours as needed., Disp: 8.5 g, Rfl: 0    Labs: Reviewed    Physical Examination:  Vital signs: /70   Pulse 71   Ht 1.854 m (6' 1\")   Wt (!) 136.1 kg (300 lb)   SpO2 97%   BMI 39.58 kg/m²  Body mass index is 39.58 kg/m². Patient's body mass index is 39.58 kg/m². Exercise and nutrition counseling were performed at this visit.  General: No apparent distress, cooperative  Eyes: No scleral icterus or discharge  ENMT: Normal on external inspection of nose, lips, normal " thyroid exam  Neck: No abnormal masses on inspection  Resp: Normal effort, clear to auscultation bilaterally   CVS: Regular rate and rhythm, S1 S2 normal, no murmur   Extremities: No edema  Abdomen: abdominal obesity present  Neuro: Alert and oriented  Skin: No rash  Psych: Normal mood and affect, intact memory and able to make informed decisions    Foot Exam:  Monofilament: done  Monofilament testing with a 10 gram force: sensation intact: intact bilaterally  Visual Inspection: Feet without maceration, ulcers, fissures.  Pedal pulses: intact bilaterally    Assessment and Plan:    1. Type 2 diabetes mellitus without complication, without long-term current use of insulin (HCC)  Start jardiance 10 mg daily; bydureon 2 mg sc once a week.  First dose of bydureon given in office.   Side effects and benefits were discussed with patient in great detail.   - Discussed diabetic diet discussed in detail-plate method.  - He will test before meals and log .  - He will walk for 20-30 minutes daily.  Contour Next meter supplied and instructed on.  - Reviewed medications and advised how to take   - Discussed importance of immunizations and yearly eye exams. 1.5 years ago.  - Encouraged patient to attend diabetes education program.   - Advised daily foot exams. Educated on signs of infection.   - Educational material distributed.   - Educated on need to stay well hydrated with water.  - Educated to call with any questions or problems.    2. Obesity (BMI 30-39.9)  Dietary and exercise measures for weight loss    3. Chronic fatigue  Secondary to uncontrolled type 2 diabetes and hypogonadism. Treating these should help reduce fatigue.     4. Hypogonadism:   Start  Testosterone cypionate 250 mg IM every 2 weeks. First dose given in office.     Return in about 2 weeks (around 10/15/2018).    Thank you for allowing me to participate in the care of this patient.    Dr. Leobardo Mccain  10/01/18    CC:   Jaime Castañeda P.A.-C.    This  note was created using voice recognition software (Dragon). The accuracy of the dictation is limited by the abilities of the software. I have reviewed the note prior to signing, however some errors in grammar and context are still possible. If you have any questions related to this note please do not hesitate to contact our office.

## 2018-10-02 RX ORDER — TESTOSTERONE CYPIONATE 200 MG/ML
250 INJECTION, SOLUTION INTRAMUSCULAR
OUTPATIENT
Start: 2018-10-02

## 2018-10-02 RX ADMIN — TESTOSTERONE CYPIONATE 250 MG: 200 INJECTION, SOLUTION INTRAMUSCULAR at 13:21

## 2018-10-08 DIAGNOSIS — E11.9 TYPE 2 DIABETES MELLITUS WITHOUT COMPLICATION, WITHOUT LONG-TERM CURRENT USE OF INSULIN (HCC): ICD-10-CM

## 2018-10-11 ENCOUNTER — OFFICE VISIT (OUTPATIENT)
Dept: HEMATOLOGY ONCOLOGY | Facility: MEDICAL CENTER | Age: 58
End: 2018-10-11
Payer: COMMERCIAL

## 2018-10-11 ENCOUNTER — HOSPITAL ENCOUNTER (OUTPATIENT)
Dept: LAB | Facility: MEDICAL CENTER | Age: 58
End: 2018-10-11
Attending: INTERNAL MEDICINE
Payer: COMMERCIAL

## 2018-10-11 VITALS
HEART RATE: 66 BPM | BODY MASS INDEX: 38.92 KG/M2 | DIASTOLIC BLOOD PRESSURE: 72 MMHG | OXYGEN SATURATION: 93 % | RESPIRATION RATE: 16 BRPM | HEIGHT: 73 IN | WEIGHT: 293.65 LBS | SYSTOLIC BLOOD PRESSURE: 142 MMHG | TEMPERATURE: 98.2 F

## 2018-10-11 DIAGNOSIS — D72.9 ABNORMAL WBC COUNT: ICD-10-CM

## 2018-10-11 LAB
BASOPHILS # BLD AUTO: 0.9 % (ref 0–1.8)
BASOPHILS # BLD: 0.07 K/UL (ref 0–0.12)
EOSINOPHIL # BLD AUTO: 0.41 K/UL (ref 0–0.51)
EOSINOPHIL NFR BLD: 5.5 % (ref 0–6.9)
ERYTHROCYTE [DISTWIDTH] IN BLOOD BY AUTOMATED COUNT: 38 FL (ref 35.9–50)
HCT VFR BLD AUTO: 43.7 % (ref 42–52)
HGB BLD-MCNC: 14 G/DL (ref 14–18)
HGB RETIC QN AUTO: 29.6 PG/CELL (ref 29–35)
IMM RETICS NFR: 21.7 % (ref 9.3–17.4)
LDH SERPL L TO P-CCNC: 183 U/L (ref 107–266)
LYMPHOCYTES # BLD AUTO: 2.12 K/UL (ref 1–4.8)
LYMPHOCYTES NFR BLD: 28.2 % (ref 22–41)
MANUAL DIFF BLD: NORMAL
MCH RBC QN AUTO: 26.2 PG (ref 27–33)
MCHC RBC AUTO-ENTMCNC: 32 G/DL (ref 33.7–35.3)
MCV RBC AUTO: 81.7 FL (ref 81.4–97.8)
MONOCYTES # BLD AUTO: 0.27 K/UL (ref 0–0.85)
MONOCYTES NFR BLD AUTO: 3.6 % (ref 0–13.4)
MYELOCYTES NFR BLD MANUAL: 0.9 %
NEUTROPHILS # BLD AUTO: 4.43 K/UL (ref 1.82–7.42)
NEUTROPHILS NFR BLD: 59.1 % (ref 44–72)
NEUTS BAND NFR BLD MANUAL: 1.8 % (ref 0–10)
PLATELET # BLD AUTO: 186 K/UL (ref 164–446)
PMV BLD AUTO: 13.9 FL (ref 9–12.9)
RBC # BLD AUTO: 5.35 M/UL (ref 4.7–6.1)
RETICS # AUTO: 0.11 M/UL (ref 0.04–0.06)
RETICS/RBC NFR: 2.1 % (ref 0.8–2.1)
URATE SERPL-MCNC: 5.7 MG/DL (ref 2.5–8.3)
WBC # BLD AUTO: 7.5 K/UL (ref 4.8–10.8)

## 2018-10-11 PROCEDURE — 84550 ASSAY OF BLOOD/URIC ACID: CPT

## 2018-10-11 PROCEDURE — 83615 LACTATE (LD) (LDH) ENZYME: CPT

## 2018-10-11 PROCEDURE — 85027 COMPLETE CBC AUTOMATED: CPT

## 2018-10-11 PROCEDURE — 82668 ASSAY OF ERYTHROPOIETIN: CPT

## 2018-10-11 PROCEDURE — 99204 OFFICE O/P NEW MOD 45 MIN: CPT | Performed by: INTERNAL MEDICINE

## 2018-10-11 PROCEDURE — 36415 COLL VENOUS BLD VENIPUNCTURE: CPT

## 2018-10-11 PROCEDURE — 85046 RETICYTE/HGB CONCENTRATE: CPT

## 2018-10-11 PROCEDURE — 85007 BL SMEAR W/DIFF WBC COUNT: CPT

## 2018-10-11 ASSESSMENT — PAIN SCALES - GENERAL: PAINLEVEL: NO PAIN

## 2018-10-11 NOTE — PROGRESS NOTES
Consult Note: Hematology    Date of consultation: 10/11/2018 3:05 PM    Referring provider: Jaime Castañeda P.A.-C      Reason for consultation: Immature WBCs seen in the routine CBC      History of presenting illness:      58 y.o. year old male found to have mildly increased immature granulocytes during his routine CBC. He is otherwise asymptomatic. He has lot of anxiety surrounding this. He was diagnosed with the diabetes. No history of recurrent infections. Aphthous ulcers or B symptoms Past Medical History: Diabetes No past medical history on file.    Past surgical history:  No past surgical history on file.    Allergies:  Patient has no known allergies.    Medications:    Current Outpatient Prescriptions   Medication Sig Dispense Refill   • Exenatide ER (BYDUREON BCISE) 2 MG/0.85ML Auto-injector Inject 2 mg as instructed every 7 days. 4 PEN 11   • Empagliflozin (JARDIANCE) 10 MG Tab Take 1 Tab by mouth every day. 30 Tab 11   • Blood Glucose Monitoring Suppl Device Meter: Dispense Device of Insurance Preference. Sig. Use as directed for blood sugar monitoring. #1. NR. 1 Device 0   • Blood Glucose Monitoring Suppl Supplies Misc Test strips order: Test strips for insurance preferred meter. Sig: use TID and prn ssx high or low sugar #100 RF x 3 100 Strip 11   • Blood Glucose Monitoring Suppl Supplies Misc Test strips order: Test strips for Andress[employer] Contour Next meter. Sig: use 3 daily and prn ssx high or low sugar 100 Strip 11   • Lancets Misc Lancets order: Lancets for insurance preferred meter. Sig: use TID and prn ssx high or low sugar. #100 RF x 3 100 Each 11   • metFORMIN ER (GLUCOPHAGE XR) 500 MG TABLET SR 24 HR Take 1 Tab by mouth every day. 30 Tab 3   • rosuvastatin (CRESTOR) 20 MG Tab TAKE 1 TABLET BY MOUTH EVERY NIGHT AT BEDTIME. 90 Tab 3   • fenofibrate micronized (LOFIBRA) 200 MG capsule TAKE 1 CAPSULE BY MOUTH EVERY MORNING BEFORE BREAKFAST. 90 Cap 3   • sertraline (ZOLOFT) 100 MG Tab TAKE 1.5 TABLETS BY  MOUTH EVERY DAY. 135 Tab 3   • albuterol 108 (90 Base) MCG/ACT Aero Soln inhalation aerosol Inhale 2 Puffs by mouth every 6 hours as needed. 8.5 g 0     Current Facility-Administered Medications   Medication Dose Route Frequency Provider Last Rate Last Dose   • testosterone cypionate (DEPO-TESTOSTERONE) injection 250 mg  250 mg Intramuscular Q14 DAYS Leobardo Mccain M.D.   250 mg at 10/02/18 1321       Social History:     Social History     Social History   • Marital status:      Spouse name: N/A   • Number of children: N/A   • Years of education: N/A     Occupational History   • Not on file.     Social History Main Topics   • Smoking status: Never Smoker   • Smokeless tobacco: Never Used   • Alcohol use 1.2 oz/week     2 Shots of liquor per week   • Drug use: No   • Sexual activity: Yes     Partners: Male     Other Topics Concern   • Not on file     Social History Narrative   • No narrative on file       Family History:     Family History   Problem Relation Age of Onset   • Cancer Father 80        Liver [place]       Review of Systems:  All other review of systems are negative except what was mentioned above in the HPI.    Constitutional: No fever, chills, weight loss ,malaise/fatigue.    HEENT: No new auditory or visual complaints. No sore throat and neck pain.     Respiratory:No new cough, sputum production, shortness of breath and wheezing.    Cardiovascular: No new chest pain, palpitations, orthopnea and leg swelling.    Gastrointestinal: No heartburn, nausea, vomiting ,abdominal pain, hematochezia or melena     Genitourinary: Negative for dysuria, hematuria    Musculoskeletal: No new arthralgias or myalgias   Skin: Negative for rash and itching.    Neurological: Negative for focal weakness or headaches.    Endo/Heme/Allergies: No abnormal bleed/bruise.    Psychiatric/Behavioral: No new depression/anxiety.    Physical Exam:  Vitals:   /72 (BP Location: Left arm, Patient Position: Sitting, BP Cuff  "Size: Large adult)   Pulse 66   Temp 36.8 °C (98.2 °F) (Temporal)   Resp 16   Ht 1.854 m (6' 1\")   Wt (!) 133.2 kg (293 lb 10.4 oz)   SpO2 93%   BMI 38.74 kg/m²     General: Not in acute distress, alert and oriented x 3  HEENT: No pallor, icterus. Oropharynx clear.   Neck: Supple, no palpable masses.  Lymph nodes: No palpable cervical, supraclavicular, axillary or inguinal lymphadenopathy.    CVS: regular rate and rhythm, no rubs or gallops  RESP: Clear to auscultate bilaterally, no wheezing or crackles.   ABD: Soft, non tender, non distended, positive bowel sounds, no palpable organomegaly. He has an abdominal lipoma  EXT: No edema or cyanosis  CNS: Alert and oriented x3, No focal deficits.  Skin- No rash      Labs:   No results for input(s): RBC, HEMOGLOBIN, HEMATOCRIT, PLATELETCT, PROTHROMBTM, APTT, INR, IRON, FERRITIN, TOTIRONBC in the last 72 hours.  Lab Results   Component Value Date/Time    SODIUM 140 09/21/2018 07:24 AM    POTASSIUM 4.3 09/21/2018 07:24 AM    CHLORIDE 106 09/21/2018 07:24 AM    CO2 27 09/21/2018 07:24 AM    GLUCOSE 144 (H) 09/21/2018 07:24 AM    BUN 15 09/21/2018 07:24 AM    CREATININE 0.92 09/21/2018 07:24 AM        Assessment and Plan:    Immature granulocytes seen in CBC-he did not have any leukocytosis, anemia or thrombocytopenia. , No B symptoms, aphthous ulcers or recurrent infections.    Due to concern for his previous results, repeat CBC with manual differential was done today, which later came back showing completely normal WBC differential. Rest of the workup including LDH, reticulocyte count, uric acid level and erythropoietin levels are unremarkable.  Subsequently informed the patient that he does not need any active intervention. We will repeat his CBC in one month just to be on the safe side.    Return to clinic when necessary  He agreed and verbalized his agreement and understanding with the current plan.  I answered all questions and concerns he has at this time.       "        Thank you for allowing me to participate in his care.    Please note that this dictation was created using voice recognition software. I have made every reasonable attempt to correct obvious errors, but I expect that there are errors of grammar and possibly content that I did not discover before finalizing the note.      SIGNATURES:  Jim Adamson    CC:  Jaime Castañeda P.A.-C.  No ref. provider found

## 2018-10-12 ENCOUNTER — TELEPHONE (OUTPATIENT)
Dept: HEMATOLOGY ONCOLOGY | Facility: MEDICAL CENTER | Age: 58
End: 2018-10-12

## 2018-10-12 DIAGNOSIS — R79.89 ABNORMAL CBC: ICD-10-CM

## 2018-10-12 NOTE — TELEPHONE ENCOUNTER
RN called to review labs per Dr. Adamson. Orders received to recheck CBC with manual Diff in 2 months. Patient states he will get them drawn at [facility]. Patient instructed to f/u in clinic PRN as needed

## 2018-10-12 NOTE — TELEPHONE ENCOUNTER
Patient would like to request a call back with lab test results to his cell phone as soon as possible. [phone].

## 2018-10-13 LAB — EPO SERPL-ACNC: 8 MU/ML (ref 4–27)

## 2018-10-15 ENCOUNTER — NON-PROVIDER VISIT (OUTPATIENT)
Dept: ENDOCRINOLOGY | Facility: MEDICAL CENTER | Age: 58
End: 2018-10-15
Payer: COMMERCIAL

## 2018-10-15 DIAGNOSIS — E29.1 HYPOGONADISM MALE: ICD-10-CM

## 2018-10-15 PROCEDURE — 96372 THER/PROPH/DIAG INJ SC/IM: CPT | Performed by: INTERNAL MEDICINE

## 2018-10-15 RX ADMIN — TESTOSTERONE CYPIONATE 250 MG: 200 INJECTION, SOLUTION INTRAMUSCULAR at 16:27

## 2018-10-18 ENCOUNTER — OFFICE VISIT (OUTPATIENT)
Dept: CARDIOLOGY | Facility: MEDICAL CENTER | Age: 58
End: 2018-10-18
Payer: COMMERCIAL

## 2018-10-18 VITALS
HEIGHT: 73 IN | OXYGEN SATURATION: 95 % | SYSTOLIC BLOOD PRESSURE: 156 MMHG | HEART RATE: 72 BPM | DIASTOLIC BLOOD PRESSURE: 70 MMHG | BODY MASS INDEX: 38.83 KG/M2 | WEIGHT: 293 LBS

## 2018-10-18 DIAGNOSIS — D72.9 ABNORMAL WBC COUNT: ICD-10-CM

## 2018-10-18 DIAGNOSIS — F32.A ANXIETY AND DEPRESSION: ICD-10-CM

## 2018-10-18 DIAGNOSIS — E11.9 TYPE 2 DIABETES MELLITUS WITHOUT COMPLICATION, WITHOUT LONG-TERM CURRENT USE OF INSULIN (HCC): ICD-10-CM

## 2018-10-18 DIAGNOSIS — R79.89 ABNORMAL CBC: ICD-10-CM

## 2018-10-18 DIAGNOSIS — E78.2 MIXED HYPERLIPIDEMIA: ICD-10-CM

## 2018-10-18 DIAGNOSIS — F41.9 ANXIETY AND DEPRESSION: ICD-10-CM

## 2018-10-18 DIAGNOSIS — G47.33 OBSTRUCTIVE SLEEP APNEA SYNDROME: ICD-10-CM

## 2018-10-18 DIAGNOSIS — E78.1 HYPERTRIGLYCERIDEMIA: ICD-10-CM

## 2018-10-18 LAB — EKG IMPRESSION: NORMAL

## 2018-10-18 PROCEDURE — 99204 OFFICE O/P NEW MOD 45 MIN: CPT | Mod: 25 | Performed by: INTERNAL MEDICINE

## 2018-10-18 PROCEDURE — 93000 ELECTROCARDIOGRAM COMPLETE: CPT | Performed by: INTERNAL MEDICINE

## 2018-10-18 RX ORDER — ROSUVASTATIN CALCIUM 40 MG/1
40 TABLET, COATED ORAL DAILY
Qty: 90 TAB | Refills: 3 | Status: SHIPPED | OUTPATIENT
Start: 2018-10-18

## 2018-10-18 RX ORDER — CALCIUM CARBONATE/VITAMIN D3 600 MG-10
2400 TABLET ORAL 2 TIMES DAILY
Qty: 120 CAP | Refills: 11
Start: 2018-10-18

## 2018-10-18 ASSESSMENT — ENCOUNTER SYMPTOMS
PND: 0
EYES NEGATIVE: 1
MUSCULOSKELETAL NEGATIVE: 1
NEUROLOGICAL NEGATIVE: 1
CARDIOVASCULAR NEGATIVE: 1
STRIDOR: 0
BRUISES/BLEEDS EASILY: 0
FEVER: 0
WEAKNESS: 0
CLAUDICATION: 0
LOSS OF CONSCIOUSNESS: 0
HEMOPTYSIS: 0
CONSTITUTIONAL NEGATIVE: 1
RESPIRATORY NEGATIVE: 1
ORTHOPNEA: 0
DIZZINESS: 0
SPUTUM PRODUCTION: 0
WHEEZING: 0
SORE THROAT: 0
GASTROINTESTINAL NEGATIVE: 1
PALPITATIONS: 0
CHILLS: 0
SHORTNESS OF BREATH: 0
COUGH: 0

## 2018-10-18 NOTE — PROGRESS NOTES
Chief Complaint   Patient presents with   • Hyperlipidemia       Subjective:   Antonio Foley is a 58 y.o. male who presents today as a new consultation.  He is a type II diabetic with morbid obesity and a history of hypertriglyceridemia.  His triglycerides have always been elevated.  He takes 2400 mg of fish oil per day.  He is also taking niacin.  He is on 20 of rosuvastatin.  He is also trying to lose weight.  He does not drink significantly.  He is never had anything wrong with his heart.  He was recently started on Jardiance for his diabetes with a plan to go up to 25 mg/day soon.  He has no chest pain palpitations or PND.  He has no shortness of breath with exertion.    History reviewed. No pertinent past medical history.  History reviewed. No pertinent surgical history.  Family History   Problem Relation Age of Onset   • Cancer Father 80        Liver CA     Social History     Social History   • Marital status:      Spouse name: N/A   • Number of children: N/A   • Years of education: N/A     Occupational History   • Not on file.     Social History Main Topics   • Smoking status: Never Smoker   • Smokeless tobacco: Never Used   • Alcohol use 1.2 oz/week     2 Shots of liquor per week   • Drug use: No   • Sexual activity: Yes     Partners: Male     Other Topics Concern   • Not on file     Social History Narrative   • No narrative on file     No Known Allergies  Outpatient Encounter Prescriptions as of 10/18/2018   Medication Sig Dispense Refill   • Omega 3 1200 MG Cap Take 2,400 mg by mouth 2 Times a Day. 120 Cap 11   • rosuvastatin (CRESTOR) 40 MG tablet Take 1 Tab by mouth every day. 90 Tab 3   • Exenatide ER (BYDUREON BCISE) 2 MG/0.85ML Auto-injector Inject 2 mg as instructed every 7 days. 4 PEN 11   • Empagliflozin (JARDIANCE) 10 MG Tab Take 1 Tab by mouth every day. 30 Tab 11   • Blood Glucose Monitoring Suppl Device Meter: Dispense Device of Insurance Preference. Sig. Use as directed for  blood sugar monitoring. #1. NR. 1 Device 0   • Blood Glucose Monitoring Suppl Supplies Misc Test strips order: Test strips for insurance preferred meter. Sig: use TID and prn ssx high or low sugar #100 RF x 3 100 Strip 11   • Lancets Misc Lancets order: Lancets for insurance preferred meter. Sig: use TID and prn ssx high or low sugar. #100 RF x 3 100 Each 11   • Blood Glucose Monitoring Suppl Supplies Misc Test strips order: Test strips for [employer] Contour Next meter. Sig: use 3 daily and prn ssx high or low sugar 100 Strip 11   • metFORMIN ER (GLUCOPHAGE XR) 500 MG TABLET SR 24 HR Take 1 Tab by mouth every day. 30 Tab 3   • fenofibrate micronized (LOFIBRA) 200 MG capsule TAKE 1 CAPSULE BY MOUTH EVERY MORNING BEFORE BREAKFAST. 90 Cap 3   • sertraline (ZOLOFT) 100 MG Tab TAKE 1.5 TABLETS BY MOUTH EVERY DAY. 135 Tab 3   • [DISCONTINUED] rosuvastatin (CRESTOR) 20 MG Tab TAKE 1 TABLET BY MOUTH EVERY NIGHT AT BEDTIME. 90 Tab 3   • [DISCONTINUED] albuterol 108 (90 Base) MCG/ACT Aero Soln inhalation aerosol Inhale 2 Puffs by mouth every 6 hours as needed. (Patient not taking: Reported on 10/18/2018) 8.5 g 0     Facility-Administered Encounter Medications as of 10/18/2018   Medication Dose Route Frequency Provider Last Rate Last Dose   • testosterone cypionate (DEPO-TESTOSTERONE) injection 250 mg  250 mg Intramuscular Q14 DAYS Leobardo Mccain M.D.   250 mg at 10/15/18 1627     Review of Systems   Constitutional: Negative.  Negative for chills, fever and malaise/fatigue.   HENT: Negative.  Negative for sore throat.    Eyes: Negative.    Respiratory: Negative.  Negative for cough, hemoptysis, sputum production, shortness of breath, wheezing and stridor.    Cardiovascular: Negative.  Negative for chest pain, palpitations, orthopnea, claudication, leg swelling and PND.   Gastrointestinal: Negative.    Genitourinary: Negative.    Musculoskeletal: Negative.    Skin: Negative.    Neurological: Negative.  Negative for dizziness,  "loss of consciousness and weakness.   Endo/Heme/Allergies: Negative.  Does not bruise/bleed easily.   All other systems reviewed and are negative.       Objective:   /70   Pulse 72   Ht 1.854 m (6' 1\")   Wt (!) 132.9 kg (293 lb)   SpO2 95%   BMI 38.66 kg/m²     Physical Exam   Constitutional: He appears well-developed and well-nourished. No distress.   HENT:   Head: Normocephalic and atraumatic.   Right Ear: External ear normal.   Left Ear: External ear normal.   Nose: Nose normal.   Mouth/Throat: No oropharyngeal exudate.   Eyes: Pupils are equal, round, and reactive to light. Conjunctivae and EOM are normal. Right eye exhibits no discharge. Left eye exhibits no discharge. No scleral icterus.   Neck: Neck supple. No JVD present.   Cardiovascular: Normal rate, regular rhythm and intact distal pulses.  Exam reveals no gallop and no friction rub.    No murmur heard.  Pulmonary/Chest: Effort normal. No stridor. No respiratory distress. He has no wheezes. He has no rales. He exhibits no tenderness.   Abdominal: Soft. He exhibits no distension. There is no guarding.   Musculoskeletal: Normal range of motion. He exhibits no edema, tenderness or deformity.   Neurological: He is alert. He has normal reflexes. He displays normal reflexes. No cranial nerve deficit. He exhibits normal muscle tone. Coordination normal.   Skin: Skin is warm and dry. No rash noted. He is not diaphoretic. No erythema. No pallor.   Psychiatric: He has a normal mood and affect. His behavior is normal. Judgment and thought content normal.   Nursing note and vitals reviewed.  EKG: Personally reviewed by myself showing normal sinus rhythm otherwise normal EKG.    Assessment:     1. Mixed hyperlipidemia  EKG   2. Obstructive sleep apnea syndrome     3. Hypertriglyceridemia  Omega 3 1200 MG Cap    rosuvastatin (CRESTOR) 40 MG tablet    CT-CARDIAC SCORING   4. Anxiety and depression     5. Abnormal WBC count     6. Abnormal CBC     7. Type 2 " diabetes mellitus without complication, without long-term current use of insulin (HCC)         Medical Decision Making:  Today's Assessment / Status / Plan:     58-year-old male with hypertriglyceridemia and a case of type 2 diabetes with morbid obesity.  We will get a calcium score.  We will increase his rosuvastatin to 40.  I think is Deangelo shah on fish oil and niacin.  I would like to discuss this case with the pathologist.  We will see him back in 3 months per    Thank for you allowing me to take part in your patient's care, please call should you have any questions or would like to discuss this patient.

## 2018-10-18 NOTE — LETTER
[facility]   [address] [place] [address] [address]  [address] [place] [address]  Phone: [phone]  Fax: [phone]              Antonio Foley  1960    Encounter Date: 10/18/2018    Zachery Moe M.D.          PROGRESS NOTE:  Chief Complaint   Patient presents with   • Hyperlipidemia       Subjective:   Antonio Foley is a 58 y.o. male who presents today as a new consultation.  He is a type II diabetic with morbid obesity and a history of hypertriglyceridemia.  His triglycerides have always been elevated.  He takes 2400 mg of fish oil per day.  He is also taking niacin.  He is on 20 of rosuvastatin.  He is also trying to lose weight.  He does not drink significantly.  He is never had anything wrong with his heart.  He was recently started on Jardiance for his diabetes with a plan to go up to 25 mg/day soon.  He has no chest pain palpitations or PND.  He has no shortness of breath with exertion.    History reviewed. No pertinent past medical history.  History reviewed. No pertinent surgical history.  Family History   Problem Relation Age of Onset   • Cancer Father 80        Liver CA     Social History     Social History   • Marital status:      Spouse name: N/A   • Number of children: N/A   • Years of education: N/A     Occupational History   • Not on file.     Social History Main Topics   • Smoking status: Never Smoker   • Smokeless tobacco: Never Used   • Alcohol use 1.2 oz/week     2 Shots of liquor per week   • Drug use: No   • Sexual activity: Yes     Partners: Male     Other Topics Concern   • Not on file     Social History Narrative   • No narrative on file     No Known Allergies  Outpatient Encounter Prescriptions as of 10/18/2018   Medication Sig Dispense Refill   • Omega 3 1200 MG Cap Take 2,400 mg by mouth 2 Times a Day. 120 Cap 11   • rosuvastatin (CRESTOR) 40 MG tablet Take 1 Tab by mouth every day. 90 Tab 3   • Exenatide ER (BYDUREON BCISE) 2  MG/0.85ML Auto-injector Inject 2 mg as instructed every 7 days. 4 PEN 11   • Empagliflozin (JARDIANCE) 10 MG Tab Take 1 Tab by mouth every day. 30 Tab 11   • Blood Glucose Monitoring Suppl Device Meter: Dispense Device of Insurance Preference. Sig. Use as directed for blood sugar monitoring. #1. NR. 1 Device 0   • Blood Glucose Monitoring Suppl Supplies Misc Test strips order: Test strips for insurance preferred meter. Sig: use TID and prn ssx high or low sugar #100 RF x 3 100 Strip 11   • Lancets Misc Lancets order: Lancets for insurance preferred meter. Sig: use TID and prn ssx high or low sugar. #100 RF x 3 100 Each 11   • Blood Glucose Monitoring Suppl Supplies Misc Test strips order: Test strips for [employer] Contour Next meter. Sig: use 3 daily and prn ssx high or low sugar 100 Strip 11   • metFORMIN ER (GLUCOPHAGE XR) 500 MG TABLET SR 24 HR Take 1 Tab by mouth every day. 30 Tab 3   • fenofibrate micronized (LOFIBRA) 200 MG capsule TAKE 1 CAPSULE BY MOUTH EVERY MORNING BEFORE BREAKFAST. 90 Cap 3   • sertraline (ZOLOFT) 100 MG Tab TAKE 1.5 TABLETS BY MOUTH EVERY DAY. 135 Tab 3   • [DISCONTINUED] rosuvastatin (CRESTOR) 20 MG Tab TAKE 1 TABLET BY MOUTH EVERY NIGHT AT BEDTIME. 90 Tab 3   • [DISCONTINUED] albuterol 108 (90 Base) MCG/ACT Aero Soln inhalation aerosol Inhale 2 Puffs by mouth every 6 hours as needed. (Patient not taking: Reported on 10/18/2018) 8.5 g 0     Facility-Administered Encounter Medications as of 10/18/2018   Medication Dose Route Frequency Provider Last Rate Last Dose   • testosterone cypionate (DEPO-TESTOSTERONE) injection 250 mg  250 mg Intramuscular Q14 DAYS Leobardo Mccain M.D.   250 mg at 10/15/18 1627     Review of Systems   Constitutional: Negative.  Negative for chills, fever and malaise/fatigue.   HENT: Negative.  Negative for sore throat.    Eyes: Negative.    Respiratory: Negative.  Negative for cough, hemoptysis, sputum production, shortness of breath, wheezing and stridor.     "  Cardiovascular: Negative.  Negative for chest pain, palpitations, orthopnea, claudication, leg swelling and PND.   Gastrointestinal: Negative.    Genitourinary: Negative.    Musculoskeletal: Negative.    Skin: Negative.    Neurological: Negative.  Negative for dizziness, loss of consciousness and weakness.   Endo/Heme/Allergies: Negative.  Does not bruise/bleed easily.   All other systems reviewed and are negative.       Objective:   /70   Pulse 72   Ht 1.854 m (6' 1\")   Wt (!) 132.9 kg (293 lb)   SpO2 95%   BMI 38.66 kg/m²      Physical Exam   Constitutional: He appears well-developed and well-nourished. No distress.   HENT:   Head: Normocephalic and atraumatic.   Right Ear: External ear normal.   Left Ear: External ear normal.   Nose: Nose normal.   Mouth/Throat: No oropharyngeal exudate.   Eyes: Pupils are equal, round, and reactive to light. Conjunctivae and EOM are normal. Right eye exhibits no discharge. Left eye exhibits no discharge. No scleral icterus.   Neck: Neck supple. No JVD present.   Cardiovascular: Normal rate, regular rhythm and intact distal pulses.  Exam reveals no gallop and no friction rub.    No murmur heard.  Pulmonary/Chest: Effort normal. No stridor. No respiratory distress. He has no wheezes. He has no rales. He exhibits no tenderness.   Abdominal: Soft. He exhibits no distension. There is no guarding.   Musculoskeletal: Normal range of motion. He exhibits no edema, tenderness or deformity.   Neurological: He is alert. He has normal reflexes. He displays normal reflexes. No cranial nerve deficit. He exhibits normal muscle tone. Coordination normal.   Skin: Skin is warm and dry. No rash noted. He is not diaphoretic. No erythema. No pallor.   Psychiatric: He has a normal mood and affect. His behavior is normal. Judgment and thought content normal.   Nursing note and vitals reviewed.  EKG: Personally reviewed by myself showing normal sinus rhythm otherwise normal " EKG.    Assessment:     1. Mixed hyperlipidemia  EKG   2. Obstructive sleep apnea syndrome     3. Hypertriglyceridemia  Omega 3 1200 MG Cap    rosuvastatin (CRESTOR) 40 MG tablet    CT-CARDIAC SCORING   4. Anxiety and depression     5. Abnormal WBC count     6. Abnormal CBC     7. Type 2 diabetes mellitus without complication, without long-term current use of insulin (HCC)         Medical Decision Making:  Today's Assessment / Status / Plan:     58-year-old male with hypertriglyceridemia and a case of type 2 diabetes with morbid obesity.  We will get a calcium score.  We will increase his rosuvastatin to 40.  I think is Deangelo shah on fish oil and niacin.  I would like to discuss this case with the pathologist.  We will see him back in 3 months per    Thank for you allowing me to take part in your patient's care, please call should you have any questions or would like to discuss this patient.      Jaime Castañeda, PCLARISSA.-C.  [address]  [address]  [facility] [id number]  VIA In Basket

## 2018-10-19 DIAGNOSIS — Z23 FLU VACCINE NEED: ICD-10-CM

## 2018-10-29 ENCOUNTER — NON-PROVIDER VISIT (OUTPATIENT)
Dept: MEDICAL GROUP | Facility: MEDICAL CENTER | Age: 58
End: 2018-10-29
Payer: COMMERCIAL

## 2018-10-29 ENCOUNTER — NON-PROVIDER VISIT (OUTPATIENT)
Dept: ENDOCRINOLOGY | Facility: MEDICAL CENTER | Age: 58
End: 2018-10-29
Payer: COMMERCIAL

## 2018-10-29 ENCOUNTER — TELEPHONE (OUTPATIENT)
Dept: CARDIOLOGY | Facility: MEDICAL CENTER | Age: 58
End: 2018-10-29

## 2018-10-29 ENCOUNTER — TELEPHONE (OUTPATIENT)
Dept: MEDICAL GROUP | Facility: MEDICAL CENTER | Age: 58
End: 2018-10-29

## 2018-10-29 ENCOUNTER — HOSPITAL ENCOUNTER (OUTPATIENT)
Dept: RADIOLOGY | Facility: MEDICAL CENTER | Age: 58
End: 2018-10-29
Attending: INTERNAL MEDICINE
Payer: COMMERCIAL

## 2018-10-29 ENCOUNTER — NON-PROVIDER VISIT (OUTPATIENT)
Dept: HEALTH INFORMATION MANAGEMENT | Facility: MEDICAL CENTER | Age: 58
End: 2018-10-29
Payer: COMMERCIAL

## 2018-10-29 VITALS — BODY MASS INDEX: 38.87 KG/M2 | WEIGHT: 293.3 LBS | HEIGHT: 73 IN

## 2018-10-29 DIAGNOSIS — E29.1 HYPOGONADISM MALE: ICD-10-CM

## 2018-10-29 DIAGNOSIS — Z23 NEEDS FLU SHOT: ICD-10-CM

## 2018-10-29 DIAGNOSIS — E78.1 HYPERTRIGLYCERIDEMIA: ICD-10-CM

## 2018-10-29 DIAGNOSIS — Z23 NEED FOR VACCINATION: ICD-10-CM

## 2018-10-29 DIAGNOSIS — Z23 NEED FOR PNEUMOCOCCAL VACCINATION: ICD-10-CM

## 2018-10-29 DIAGNOSIS — E11.9 TYPE 2 DIABETES MELLITUS WITHOUT COMPLICATION, WITHOUT LONG-TERM CURRENT USE OF INSULIN (HCC): ICD-10-CM

## 2018-10-29 PROCEDURE — 96372 THER/PROPH/DIAG INJ SC/IM: CPT | Performed by: INTERNAL MEDICINE

## 2018-10-29 PROCEDURE — 4410556 CT-CARDIAC SCORING

## 2018-10-29 PROCEDURE — 97802 MEDICAL NUTRITION INDIV IN: CPT | Performed by: DIETITIAN, REGISTERED

## 2018-10-29 RX ADMIN — TESTOSTERONE CYPIONATE 250 MG: 200 INJECTION, SOLUTION INTRAMUSCULAR at 15:09

## 2018-10-29 NOTE — TELEPHONE ENCOUNTER
Called pt with CT cardiac scoring results. Pt states understanding and with follow up as previously.

## 2018-10-29 NOTE — NON-PROVIDER
"Antonio Foley is a 58 y.o. male here for a non-provider visit for:   FLU    Reason for immunization: Annual Flu Vaccine  Immunization records indicate need for vaccine: Yes, confirmed with Epic  Minimum interval has been met for this vaccine: Yes  ABN completed: Yes    Order and dose verified by:   VIS Dated  8/7/15 was given to patient: Yes  All IAC Questionnaire questions were answered \"No.\"    Patient tolerated injection and no adverse effects were observed or reported: Yes    Pt scheduled for next dose in series: No  "

## 2018-10-29 NOTE — PROGRESS NOTES
"10/29/2018 Referring Provider: Jaime Castañeda P.A.-C.       Time in/out:  2:08-2:47pm     Anthropometrics/Objective  Vitals:    10/29/18 1452   Weight: (!) 133 kg (293 lb 4.8 oz)   Height: 1.854 m (6' 1\")       Body mass index is 38.7 kg/m².      Stated Goal Weight: NA    See comprehensive patient history form for further information     Subjective:  Pt states he was recently diagnosed with Type 2 diabetes.   He started Metformin, Jardiance, and Bydureon about 1 month ago   Is checking FSBS 1-2 times a day. Fastings =  with one day at 178. Pre-meal=    Had 1 hypoglycemic episode of 48mg/dL when he skipped lunch   Pt has done atkins diet in the past and felt good doing so. He does not like high carb foods and prefers meats and proteins   Has been avoiding grains (bread, pasta, rice etc). Eating mostly meats, beans, cheese, vegetables, nuts/seeds and other plant based fats   Eats 3meals and 1-2snacks   Drinking a lot of water; up to 10 16oz water bottles a day and is still thirsty     Nutrition Diagnosis (PES Statement)  · Altered nutrition related lab values related to endocrine dysfunction as evidenced by HgbA1c of 6.6, fasting glucose 144     Client history:  Condition(s) associated with a diagnosis or treatment (specify) Type 2 DM, IDALIA, hyperlipidemia ,    Biochemical data, medical test and procedures  Lab Results   Component Value Date/Time    HBA1C 6.6 (H) 09/21/2018 07:24 AM   @  No results found for: POCGLUCOSE  Lab Results   Component Value Date/Time    CHOLSTRLTOT 161 09/21/2018 07:24 AM    LDL 47 09/21/2018 07:24 AM    HDL 38 (A) 09/21/2018 07:24 AM    TRIGLYCERIDE 381 (H) 09/21/2018 07:24 AM         Nutrition Intervention  Nutrition Prescription  Recommended Daily Kcals : ~2200kcal based on [place] Jeor   Recommended Daily Protein: 110-160g 20-30%kcal (max 20% if doing Ketogenic diet)     Meal and Snack  Recommend a carbohydrate controlled, heart healthy diet     Comprehensive Nutrition " education Instruction or training leading to in-depth nutrition related knowledge about:  Combine carb, protein and fat at each meal, Meal timing and spacing, Metabolism of carb, protein, fat, Portion control, Sweets and alcohol in moderation, Heart-healthy guidelines, Label Reading, Handouts provided regarding topics discussed and Theraputic diet for Type 2 Diabetes     Monitoring & Evaluation Plan    Behavioral-Environmental:  Behavior: Read labels to monitor carbohydrate intake   Physical activity: Exercise as able; increase to 150mins per week at least 3 days per week.     Food / Nutrient Intake:  Fluid/Beverage intake : Continue to drink adequate water (especially with anticipated ketosis and while on jardiance). Crystal Light sugar free beverage is ok in moderation.     Food intake: Use plate method to control portions and balance macronutrients at meals. Eat 3 meals per day and healthy snacks in between as needed to avoid going >4hrs without eating.  Do not skip meals. Consume protein at each meal and snack. Provided list of high protein snack ideas. Have HS snack before bed to see if this improves fasting glucose levels.   Increase intake of non starchy vegetables.     Macronutrients intake: If choosing to do ketogenic diet, ensure majority of fat intake is from unsaturated fats over saturated fat or trans fats. Monitor calorie intake to prevent excess kcal from fats which are most calorically dense.     Physical Signs / Symptoms:  HbA1c profiles <6.5% or per Endocrinologist discretion   Weight loss: -1-2lb per week to achieve BMI <25     Assessment Notes:  Pt has made some drastic changes to his diet over the past month, since being diagnosed with Type 2 Diabetes. He has cut out most starches/high carbs foods and is likely consuming <50 g total carbs per day. Based on his diet hx and smell of ketones on his breath I would anticipate that he is in ketosis. This is likely also why he is urinating excessively  and has such a high level of thirst, despite blood sugars normalized. I sent a message to his Endocrinologist to order labs to check his electrolytes with such high intake of water, taking a SGLT 2 inhibitor, and likely ketogenic state, to rule out electrolyte deficiencies.  Pt is choosing to follow a low carbohydrate diet. He was educated on higher quality carbohydrates that he can eat in moderation if he chooses. Encouraged more plant based foods such as vegetables, nuts, seeds, avocados etc in addition to his high protein consumption.     Follow up PRN   An Hilario, RD, LD, CDE  [phone]

## 2018-10-29 NOTE — TELEPHONE ENCOUNTER
1. Caller Name: Antonio Foley   Call Back Number: [phone] (home)         Patient approves a detailed voicemail message: yes    Patient is on the MA Schedule today for Flu & Pneum  vaccine/injection.    SPECIFIC Action To Be Taken: Orders pending, please sign.

## 2018-10-30 PROCEDURE — 90686 IIV4 VACC NO PRSV 0.5 ML IM: CPT | Performed by: PHYSICIAN ASSISTANT

## 2018-10-30 PROCEDURE — 90471 IMMUNIZATION ADMIN: CPT | Performed by: PHYSICIAN ASSISTANT

## 2018-11-02 DIAGNOSIS — E11.9 TYPE 2 DIABETES MELLITUS WITHOUT COMPLICATION, WITHOUT LONG-TERM CURRENT USE OF INSULIN (HCC): ICD-10-CM

## 2018-11-02 RX ORDER — METFORMIN HYDROCHLORIDE 500 MG/1
500 TABLET, EXTENDED RELEASE ORAL DAILY
Qty: 90 TAB | Refills: 3 | Status: SHIPPED | OUTPATIENT
Start: 2018-11-02 | End: 2018-12-03 | Stop reason: SDUPTHER

## 2018-11-12 ENCOUNTER — NON-PROVIDER VISIT (OUTPATIENT)
Dept: ENDOCRINOLOGY | Facility: MEDICAL CENTER | Age: 58
End: 2018-11-12
Payer: COMMERCIAL

## 2018-11-12 DIAGNOSIS — E29.1 HYPOGONADISM MALE: ICD-10-CM

## 2018-11-12 PROCEDURE — 96372 THER/PROPH/DIAG INJ SC/IM: CPT | Performed by: INTERNAL MEDICINE

## 2018-11-12 RX ADMIN — TESTOSTERONE CYPIONATE 250 MG: 200 INJECTION, SOLUTION INTRAMUSCULAR at 08:32

## 2018-11-20 ENCOUNTER — APPOINTMENT (OUTPATIENT)
Dept: SLEEP MEDICINE | Facility: MEDICAL CENTER | Age: 58
End: 2018-11-20
Payer: COMMERCIAL

## 2018-11-26 ENCOUNTER — NON-PROVIDER VISIT (OUTPATIENT)
Dept: ENDOCRINOLOGY | Facility: MEDICAL CENTER | Age: 58
End: 2018-11-26
Payer: COMMERCIAL

## 2018-11-26 ENCOUNTER — APPOINTMENT (OUTPATIENT)
Dept: ENDOCRINOLOGY | Facility: MEDICAL CENTER | Age: 58
End: 2018-11-26
Payer: COMMERCIAL

## 2018-11-26 DIAGNOSIS — E29.1 HYPOGONADISM MALE: ICD-10-CM

## 2018-11-26 DIAGNOSIS — E29.1 HYPOGONADISM IN MALE: ICD-10-CM

## 2018-11-26 PROCEDURE — 96372 THER/PROPH/DIAG INJ SC/IM: CPT | Performed by: INTERNAL MEDICINE

## 2018-11-26 RX ORDER — TESTOSTERONE CYPIONATE 200 MG/ML
250 INJECTION, SOLUTION INTRAMUSCULAR
Qty: 10 ML | Refills: 0 | Status: SHIPPED | OUTPATIENT
Start: 2018-11-26 | End: 2018-12-03 | Stop reason: SDUPTHER

## 2018-11-26 RX ADMIN — TESTOSTERONE CYPIONATE 250 MG: 200 INJECTION, SOLUTION INTRAMUSCULAR at 15:56

## 2018-11-27 ENCOUNTER — HOSPITAL ENCOUNTER (OUTPATIENT)
Dept: LAB | Facility: MEDICAL CENTER | Age: 58
End: 2018-11-27
Attending: INTERNAL MEDICINE
Payer: COMMERCIAL

## 2018-11-27 ENCOUNTER — HOSPITAL ENCOUNTER (OUTPATIENT)
Dept: LAB | Facility: MEDICAL CENTER | Age: 58
End: 2018-11-27
Attending: PHYSICIAN ASSISTANT
Payer: COMMERCIAL

## 2018-11-27 DIAGNOSIS — E11.9 TYPE 2 DIABETES MELLITUS WITHOUT COMPLICATION, WITHOUT LONG-TERM CURRENT USE OF INSULIN (HCC): ICD-10-CM

## 2018-11-27 DIAGNOSIS — E66.9 OBESITY (BMI 30-39.9): ICD-10-CM

## 2018-11-27 DIAGNOSIS — R79.89 ABNORMAL CBC: ICD-10-CM

## 2018-11-27 DIAGNOSIS — E78.1 HYPERTRIGLYCERIDEMIA: ICD-10-CM

## 2018-11-27 DIAGNOSIS — R53.82 CHRONIC FATIGUE: ICD-10-CM

## 2018-11-27 LAB
25(OH)D3 SERPL-MCNC: 24 NG/ML (ref 30–100)
BASOPHILS # BLD AUTO: 0.9 % (ref 0–1.8)
BASOPHILS # BLD: 0.05 K/UL (ref 0–0.12)
EOSINOPHIL # BLD AUTO: 0.05 K/UL (ref 0–0.51)
EOSINOPHIL NFR BLD: 0.8 % (ref 0–6.9)
ERYTHROCYTE [DISTWIDTH] IN BLOOD BY AUTOMATED COUNT: 38.4 FL (ref 35.9–50)
HCT VFR BLD AUTO: 47.7 % (ref 42–52)
HGB BLD-MCNC: 15.4 G/DL (ref 14–18)
LYMPHOCYTES # BLD AUTO: 1.43 K/UL (ref 1–4.8)
LYMPHOCYTES NFR BLD: 24.3 % (ref 22–41)
MANUAL DIFF BLD: NORMAL
MCH RBC QN AUTO: 26.6 PG (ref 27–33)
MCHC RBC AUTO-ENTMCNC: 32.3 G/DL (ref 33.7–35.3)
MCV RBC AUTO: 82.4 FL (ref 81.4–97.8)
METAMYELOCYTES NFR BLD MANUAL: 0.9 %
MONOCYTES # BLD AUTO: 0.21 K/UL (ref 0–0.85)
MONOCYTES NFR BLD AUTO: 3.5 % (ref 0–13.4)
NEUTROPHILS # BLD AUTO: 3.9 K/UL (ref 1.82–7.42)
NEUTROPHILS NFR BLD: 66.1 % (ref 44–72)
NEUTS BAND NFR BLD MANUAL: 3.5 % (ref 0–10)
PLATELET # BLD AUTO: 188 K/UL (ref 164–446)
PMV BLD AUTO: 13.3 FL (ref 9–12.9)
RBC # BLD AUTO: 5.79 M/UL (ref 4.7–6.1)
VIT B12 SERPL-MCNC: 960 PG/ML (ref 211–911)
WBC # BLD AUTO: 5.9 K/UL (ref 4.8–10.8)

## 2018-11-27 PROCEDURE — 82607 VITAMIN B-12: CPT

## 2018-11-27 PROCEDURE — 82306 VITAMIN D 25 HYDROXY: CPT

## 2018-11-27 PROCEDURE — 80053 COMPREHEN METABOLIC PANEL: CPT

## 2018-11-27 PROCEDURE — 80061 LIPID PANEL: CPT

## 2018-11-27 PROCEDURE — 36415 COLL VENOUS BLD VENIPUNCTURE: CPT

## 2018-11-27 PROCEDURE — 85007 BL SMEAR W/DIFF WBC COUNT: CPT

## 2018-11-27 PROCEDURE — 80048 BASIC METABOLIC PNL TOTAL CA: CPT

## 2018-11-27 PROCEDURE — 85027 COMPLETE CBC AUTOMATED: CPT

## 2018-11-28 LAB
ALBUMIN SERPL BCP-MCNC: 5 G/DL (ref 3.2–4.9)
ALBUMIN/GLOB SERPL: 1.7 G/DL
ALP SERPL-CCNC: 43 U/L (ref 30–99)
ALT SERPL-CCNC: 82 U/L (ref 2–50)
ANION GAP SERPL CALC-SCNC: 11 MMOL/L (ref 0–11.9)
ANION GAP SERPL CALC-SCNC: 9 MMOL/L (ref 0–11.9)
AST SERPL-CCNC: 49 U/L (ref 12–45)
BILIRUB SERPL-MCNC: 0.7 MG/DL (ref 0.1–1.5)
BUN SERPL-MCNC: 22 MG/DL (ref 8–22)
BUN SERPL-MCNC: 22 MG/DL (ref 8–22)
CALCIUM SERPL-MCNC: 10.1 MG/DL (ref 8.5–10.5)
CALCIUM SERPL-MCNC: 10.2 MG/DL (ref 8.5–10.5)
CHLORIDE SERPL-SCNC: 104 MMOL/L (ref 96–112)
CHLORIDE SERPL-SCNC: 105 MMOL/L (ref 96–112)
CHOLEST SERPL-MCNC: 98 MG/DL (ref 100–199)
CO2 SERPL-SCNC: 24 MMOL/L (ref 20–33)
CO2 SERPL-SCNC: 25 MMOL/L (ref 20–33)
CREAT SERPL-MCNC: 0.96 MG/DL (ref 0.5–1.4)
CREAT SERPL-MCNC: 0.96 MG/DL (ref 0.5–1.4)
FASTING STATUS PATIENT QL REPORTED: NORMAL
GLOBULIN SER CALC-MCNC: 3 G/DL (ref 1.9–3.5)
GLUCOSE SERPL-MCNC: 101 MG/DL (ref 65–99)
GLUCOSE SERPL-MCNC: 103 MG/DL (ref 65–99)
HDLC SERPL-MCNC: 33 MG/DL
LDLC SERPL CALC-MCNC: 22 MG/DL
POTASSIUM SERPL-SCNC: 4.1 MMOL/L (ref 3.6–5.5)
POTASSIUM SERPL-SCNC: 4.1 MMOL/L (ref 3.6–5.5)
PROT SERPL-MCNC: 8 G/DL (ref 6–8.2)
SODIUM SERPL-SCNC: 139 MMOL/L (ref 135–145)
SODIUM SERPL-SCNC: 139 MMOL/L (ref 135–145)
TRIGL SERPL-MCNC: 217 MG/DL (ref 0–149)

## 2018-12-03 ENCOUNTER — OFFICE VISIT (OUTPATIENT)
Dept: ENDOCRINOLOGY | Facility: MEDICAL CENTER | Age: 58
End: 2018-12-03

## 2018-12-03 VITALS
SYSTOLIC BLOOD PRESSURE: 120 MMHG | HEART RATE: 80 BPM | DIASTOLIC BLOOD PRESSURE: 60 MMHG | OXYGEN SATURATION: 80 % | HEIGHT: 74 IN | BODY MASS INDEX: 36.96 KG/M2 | WEIGHT: 288 LBS

## 2018-12-03 DIAGNOSIS — E11.9 TYPE 2 DIABETES MELLITUS WITHOUT COMPLICATION, WITHOUT LONG-TERM CURRENT USE OF INSULIN (HCC): ICD-10-CM

## 2018-12-03 DIAGNOSIS — E11.65 UNCONTROLLED TYPE 2 DIABETES MELLITUS WITH HYPERGLYCEMIA (HCC): ICD-10-CM

## 2018-12-03 DIAGNOSIS — E66.9 OBESITY (BMI 30-39.9): ICD-10-CM

## 2018-12-03 DIAGNOSIS — R74.8 ELEVATED LIVER ENZYMES: ICD-10-CM

## 2018-12-03 DIAGNOSIS — E29.1 HYPOGONADISM IN MALE: ICD-10-CM

## 2018-12-03 DIAGNOSIS — E29.1 HYPOGONADISM MALE: ICD-10-CM

## 2018-12-03 LAB — HBA1C MFR BLD: 5.3 % (ref ?–5.8)

## 2018-12-03 PROCEDURE — 99215 OFFICE O/P EST HI 40 MIN: CPT | Performed by: INTERNAL MEDICINE

## 2018-12-03 RX ORDER — SYRINGE W-NEEDLE,DISPOSAB,3 ML 25GX5/8"
SYRINGE, EMPTY DISPOSABLE MISCELLANEOUS
Qty: 12 EACH | Refills: 1 | Status: SHIPPED | OUTPATIENT
Start: 2018-12-03

## 2018-12-03 RX ORDER — TESTOSTERONE CYPIONATE 200 MG/ML
250 INJECTION, SOLUTION INTRAMUSCULAR
Qty: 10 ML | Refills: 0 | Status: SHIPPED | OUTPATIENT
Start: 2018-12-03 | End: 2019-03-25

## 2018-12-03 RX ORDER — METFORMIN HYDROCHLORIDE 500 MG/1
1000 TABLET, EXTENDED RELEASE ORAL 2 TIMES DAILY
Qty: 360 TAB | Refills: 3 | Status: SHIPPED | OUTPATIENT
Start: 2018-12-03 | End: 2018-12-03 | Stop reason: SDUPTHER

## 2018-12-03 RX ORDER — METFORMIN HYDROCHLORIDE 500 MG/1
1000 TABLET, EXTENDED RELEASE ORAL 2 TIMES DAILY
Qty: 360 TAB | Refills: 3 | Status: SHIPPED
Start: 2018-12-03

## 2018-12-03 RX ORDER — METFORMIN HYDROCHLORIDE 500 MG/1
500 TABLET, EXTENDED RELEASE ORAL DAILY
Qty: 90 TAB | Refills: 3 | Status: SHIPPED
Start: 2018-12-03

## 2018-12-03 NOTE — PROGRESS NOTES
Endocrinology Clinic Progress Note  PCP: Jaime Castañeda P.A.-C.    HPI:  Antonio Foley is a 58 y.o. old patient who comes in today for routine follow up of Management of Uncontrolled Type 2 Diabetes, Hypogonadism, and obesity.Feeling good with no complaints.    HPI:  Antonio Foley is a 58 y.o. old patient who comes in today for evaluation of above stated problem.    Most Recent HbA1c:   Lab Results   Component Value Date/Time    HBA1C 5.3 12/03/2018    previously was 6.6%.    Current Diabetes Regimen:  GLP-1 Agent: Exenatide once weekly (2mg)   SGLT-2 Inhibitor:  Empagliflozin 10 mg once daily   Metformin  mg daily  Testing daily at different times.  Before Breakfast:  129,115,99,120  Before Lunch:  91, 105, 98,83  Before Dinner: 98, 82, 108, 93, 93030, 107, 89  Hypoglycemia:  None    ROS:  Constitutional: 12 pound loss  Cardiac: No palpitations or racing heart  Resp: No shortness of breath  Neuro: No numbness or tinging in feet  Endo: No heat or cold intolerance, no polyuria or polydipsia  All other systems were reviewed and were negative.    Past Medical History:  Patient Active Problem List    Diagnosis Date Noted   • Abnormal WBC count 10/11/2018   • Hypogonadism male 09/30/2018   • Hypertriglyceridemia 09/28/2018   • Chronic fatigue 09/28/2018   • Abnormal CBC 09/28/2018   • Abdominal wall mass 09/28/2018   • Obstructive sleep apnea syndrome 09/28/2018   • Type 2 diabetes mellitus without complication, without long-term current use of insulin ([facility]) 09/21/2018   • Anxiety and depression 03/03/2017   • Mixed hyperlipidemia 03/03/2017   • Preventative health care 03/03/2017   • Obesity (BMI 30-39.9) 03/03/2017       Past Surgical History:  History reviewed. No pertinent surgical history.    Allergies:  Patient has no known allergies.    Social History:  Social History     Social History   • Marital status:      Spouse name: N/A   • Number of children: N/A   • Years of education: N/A  "    Occupational History   • Not on file.     Social History Main Topics   • Smoking status: Never Smoker   • Smokeless tobacco: Never Used   • Alcohol use 1.2 oz/week     2 Shots of liquor per week   • Drug use: No   • Sexual activity: Yes     Partners: Male     Other Topics Concern   • Not on file     Social History Narrative   • No narrative on file       Family History:  Family History   Problem Relation Age of Onset   • Cancer Father 80        Liver CA       Medications:    Current Outpatient Prescriptions:   •  testosterone cypionate (DEPO-TESTOSTERONE) 200 MG/ML Solution injection, 1.25 mL by Intramuscular route every 14 days for 112 days., Disp: 10 mL, Rfl: 0  •  Syringe/Needle, Disp, (SYRINGE 3CC/96ED7-4/2\") 22G X 1-1/2\" 3 ML Misc, For use with Intra-muscular testosterone  injection every 2 weeks., Disp: 12 Each, Rfl: 1  •  metFORMIN ER (GLUCOPHAGE XR) 500 MG TABLET SR 24 HR, Take 1 Tab by mouth every day., Disp: 90 Tab, Rfl: 3  •  metFORMIN ER (GLUCOPHAGE XR) 500 MG TABLET SR 24 HR, Take 2 Tabs by mouth 2 times a day., Disp: 360 Tab, Rfl: 3  •  Exenatide ER (BYDUREON BCISE) 2 MG/0.85ML Auto-injector, Inject 2 mg as instructed every 7 days., Disp: 12 PEN, Rfl: 3  •  Empagliflozin (JARDIANCE) 10 MG Tab, Take 1 Tab by mouth every day., Disp: 90 Tab, Rfl: 3  •  Omega 3 1200 MG Cap, Take 2,400 mg by mouth 2 Times a Day., Disp: 120 Cap, Rfl: 11  •  rosuvastatin (CRESTOR) 40 MG tablet, Take 1 Tab by mouth every day., Disp: 90 Tab, Rfl: 3  •  Blood Glucose Monitoring Suppl Device, Meter: Dispense Device of Insurance Preference. Sig. Use as directed for blood sugar monitoring. #1. NR., Disp: 1 Device, Rfl: 0  •  Blood Glucose Monitoring Suppl Supplies Misc, Test strips order: Test strips for insurance preferred meter. Sig: use TID and prn ssx high or low sugar #100 RF x 3, Disp: 100 Strip, Rfl: 11  •  Lancets Misc, Lancets order: Lancets for insurance preferred meter. Sig: use TID and prn ssx high or low sugar. " "#100 RF x 3, Disp: 100 Each, Rfl: 11  •  Blood Glucose Monitoring Suppl Supplies Misc, Test strips order: Test strips for [employer]ress[employer] Contour Next meter. Sig: use 3 daily and prn ssx high or low sugar, Disp: 100 Strip, Rfl: 11  •  fenofibrate micronized (LOFIBRA) 200 MG capsule, TAKE 1 CAPSULE BY MOUTH EVERY MORNING BEFORE BREAKFAST., Disp: 90 Cap, Rfl: 3  •  sertraline (ZOLOFT) 100 MG Tab, TAKE 1.5 TABLETS BY MOUTH EVERY DAY., Disp: 135 Tab, Rfl: 3    Current Facility-Administered Medications:   •  testosterone cypionate (DEPO-TESTOSTERONE) injection 250 mg, 250 mg, Intramuscular, Q14 DAYS, Leobardo Mccain M.D., 250 mg at 11/26/18 1556    Labs: Reviewed    Physical Examination:  Vital signs: /60   Pulse 80   Ht 1.88 m (6' 2\")   Wt (!) 130.6 kg (288 lb)   SpO2 (!) 80%   BMI 36.98 kg/m²   Body mass index is 36.98 kg/m². Patient's body mass index is 36.98 kg/m². Exercise and nutrition counseling were performed at this visit.  Walking dog and going to the gym  General: No apparent distress, cooperative  Eyes: No scleral icterus or discharge  ENMT: Normal on external inspection of nose, lips, normal thyroid exam  Neck: No abnormal masses on inspection  Resp: Normal effort, clear to auscultation bilaterally   CVS: Regular rate and rhythm, S1 S2 normal, no murmur   Extremities: No edema  Abdomen: abdominal obesity present  Neuro: Alert and oriented  Skin: No rash  Psych: Normal mood and affect, intact memory and able to make informed decisions    Foot Exam:  Monofilament: done  Monofilament testing with a 10 gram force: sensation intact: intact bilaterally  Visual Inspection: Feet without maceration, ulcers, fissures.  Pedal pulses: intact bilaterally    Assessment and Plan:    1. Uncontrolled type 2 diabetes mellitus with hyperglycemia (HCC)  Continue Bydureon 2 mg weekly and Jardiance 10 mg daily.  Increase Metformin  mg to 2 tablets BID.  Please note that he has failed metformin therapy and therefore will " require additional therapeutic agent.  The following was reviewed by Jannette Parker RN CDE  - Discussed diabetic diet discussed in detail-plate method.  Eating low carbohydrates.   - He will test before meals and log .  - He will walk for 20-30 minutes daily.  - Reviewed medications and advised how to take   - Discussed importance of immunizations and yearly eye exams. Saw Dr. Henriquez on 10/22/18  - Encouraged patient to attend diabetes education program. Saw the dietitian for a meal plan.  - Advised daily foot exams. Educated on signs of infection.   - Educated on need to stay well hydrated with water.  - Educated to call with any questions or problems.    2. Hypogonadism male  He will start giving his own Testosterone Cypionate 250 mg IM every 14 days.    3. Obesity (BMI 30-39.9)  Continue with diet and exercise regimen.    Return in about 2 months (around 2/3/2019).    Total face to face time spent with patient equals 60 minutes. 35/60 minutes were spent on counseling the patient about the mechanism of action, side effects and benefits of GLP-1 therapy, SGLT-Inhibitor therapy. I also counseled the patient on hypoglycemia recognition and management.  Also counseled the patient on the goal A1c and water normal A1c means.  I also again emphasized on the benefits of testosterone treatment.    Thank you for allowing me to participate in the care of this patient.    Dr. Leobardo Mccain  12/03/18    CC:   Jaime Castañeda PEBENEZER.    This note was created using voice recognition software (Dragon). The accuracy of the dictation is limited by the abilities of the software. I have reviewed the note prior to signing, however some errors in grammar and context are still possible. If you have any questions related to this note please do not hesitate to contact our office.

## 2018-12-06 ENCOUNTER — NON-PROVIDER VISIT (OUTPATIENT)
Dept: MEDICAL GROUP | Facility: MEDICAL CENTER | Age: 58
End: 2018-12-06

## 2018-12-06 ENCOUNTER — APPOINTMENT (OUTPATIENT)
Dept: MEDICAL GROUP | Facility: MEDICAL CENTER | Age: 58
End: 2018-12-06

## 2018-12-06 DIAGNOSIS — Z23 NEED FOR PNEUMOCOCCAL VACCINATION: ICD-10-CM

## 2018-12-06 NOTE — NON-PROVIDER
"Antonio Foley is a 58 y.o. male here for a non-provider visit for:   PREVNAR (PCV13) 1 of 2    Reason for immunization: needed for work/school  Immunization records indicate need for vaccine: Yes, confirmed with Epic  Minimum interval has been met for this vaccine: Yes  ABN completed: Not Indicated    Order and dose verified by:   VIS Dated  11/05/15 was given to patient: Yes  All IAC Questionnaire questions were answered \"No.\"    Patient tolerated injection and no adverse effects were observed or reported: Yes    Pt scheduled for next dose in series: No  "

## 2019-01-18 DIAGNOSIS — E11.9 TYPE 2 DIABETES MELLITUS WITHOUT COMPLICATION, WITHOUT LONG-TERM CURRENT USE OF INSULIN (HCC): ICD-10-CM

## 2019-01-21 ENCOUNTER — TELEPHONE (OUTPATIENT)
Dept: ENDOCRINOLOGY | Facility: MEDICAL CENTER | Age: 59
End: 2019-01-21

## 2019-01-24 NOTE — TELEPHONE ENCOUNTER
Check PA through cover my meds. Pa was approved.     Called Pharmacy to confirm Pt was able to . They were able to run the Rx and Pt has already pick it up.

## 2019-12-23 ENCOUNTER — TELEPHONE (OUTPATIENT)
Dept: ENDOCRINOLOGY | Facility: MEDICAL CENTER | Age: 59
End: 2019-12-23

## 2019-12-23 NOTE — TELEPHONE ENCOUNTER
DOCUMENTATION OF PAR STATUS:    1. Name of Medication & Dose: Jardiance 10mg     2. Name of Prescription Coverage Company & phone #: [facility] [employer] Ph: [phone]    3. Date Prior Auth Submitted: 12/23/19    4. What information was given to obtain insurance decision? E11.65 chart notes and labs    5. Prior Auth Status? Pending    6. Action Taken: Pharmacy Notified: yes

## 2019-12-24 NOTE — TELEPHONE ENCOUNTER
FINAL PRIOR AUTHORIZATION STATUS:    1.  Name of Medication & Dose: Jardiance 10mg      2. Prior Auth Status (if approved--length of approval):  Approved through 12/31/20    3. Action Taken: Pharmacy Notified: yes    Documentation was scanned into media and attached to this telephone encounter.